# Patient Record
Sex: MALE | Race: BLACK OR AFRICAN AMERICAN | NOT HISPANIC OR LATINO | URBAN - METROPOLITAN AREA
[De-identification: names, ages, dates, MRNs, and addresses within clinical notes are randomized per-mention and may not be internally consistent; named-entity substitution may affect disease eponyms.]

---

## 2018-05-27 ENCOUNTER — EMERGENCY (EMERGENCY)
Facility: HOSPITAL | Age: 23
LOS: 1 days | Discharge: HOME | End: 2018-05-27
Attending: EMERGENCY MEDICINE | Admitting: EMERGENCY MEDICINE
Payer: COMMERCIAL

## 2018-05-27 VITALS
OXYGEN SATURATION: 100 % | DIASTOLIC BLOOD PRESSURE: 68 MMHG | RESPIRATION RATE: 16 BRPM | TEMPERATURE: 98 F | SYSTOLIC BLOOD PRESSURE: 143 MMHG | HEART RATE: 57 BPM

## 2018-05-27 VITALS
HEART RATE: 64 BPM | SYSTOLIC BLOOD PRESSURE: 136 MMHG | TEMPERATURE: 98 F | RESPIRATION RATE: 16 BRPM | OXYGEN SATURATION: 100 % | DIASTOLIC BLOOD PRESSURE: 72 MMHG

## 2018-05-27 DIAGNOSIS — Z98.890 OTHER SPECIFIED POSTPROCEDURAL STATES: Chronic | ICD-10-CM

## 2018-05-27 DIAGNOSIS — Z90.49 ACQUIRED ABSENCE OF OTHER SPECIFIED PARTS OF DIGESTIVE TRACT: Chronic | ICD-10-CM

## 2018-05-27 LAB
ALBUMIN SERPL ELPH-MCNC: 5.3 G/DL — HIGH (ref 3.3–5)
ALP SERPL-CCNC: 82 U/L — SIGNIFICANT CHANGE UP (ref 40–120)
ALT FLD-CCNC: 16 U/L — SIGNIFICANT CHANGE UP (ref 4–41)
APAP SERPL-MCNC: < 15 UG/ML — LOW (ref 15–25)
AST SERPL-CCNC: 26 U/L — SIGNIFICANT CHANGE UP (ref 4–40)
BASOPHILS # BLD AUTO: 0.03 K/UL — SIGNIFICANT CHANGE UP (ref 0–0.2)
BASOPHILS NFR BLD AUTO: 0.2 % — SIGNIFICANT CHANGE UP (ref 0–2)
BASOPHILS NFR SPEC: 0 % — SIGNIFICANT CHANGE UP (ref 0–2)
BILIRUB SERPL-MCNC: < 0.2 MG/DL — LOW (ref 0.2–1.2)
BUN SERPL-MCNC: 11 MG/DL — SIGNIFICANT CHANGE UP (ref 7–23)
CALCIUM SERPL-MCNC: 9.8 MG/DL — SIGNIFICANT CHANGE UP (ref 8.4–10.5)
CHLORIDE SERPL-SCNC: 100 MMOL/L — SIGNIFICANT CHANGE UP (ref 98–107)
CO2 SERPL-SCNC: 25 MMOL/L — SIGNIFICANT CHANGE UP (ref 22–31)
CREAT SERPL-MCNC: 0.97 MG/DL — SIGNIFICANT CHANGE UP (ref 0.5–1.3)
EOSINOPHIL # BLD AUTO: 0.02 K/UL — SIGNIFICANT CHANGE UP (ref 0–0.5)
EOSINOPHIL NFR BLD AUTO: 0.1 % — SIGNIFICANT CHANGE UP (ref 0–6)
EOSINOPHIL NFR FLD: 0 % — SIGNIFICANT CHANGE UP (ref 0–6)
ETHANOL BLD-MCNC: 139 MG/DL — HIGH
GIANT PLATELETS BLD QL SMEAR: PRESENT — SIGNIFICANT CHANGE UP
GLUCOSE SERPL-MCNC: 100 MG/DL — HIGH (ref 70–99)
HCT VFR BLD CALC: 43.7 % — SIGNIFICANT CHANGE UP (ref 39–50)
HGB BLD-MCNC: 14.1 G/DL — SIGNIFICANT CHANGE UP (ref 13–17)
IMM GRANULOCYTES # BLD AUTO: 0.06 # — SIGNIFICANT CHANGE UP
IMM GRANULOCYTES NFR BLD AUTO: 0.4 % — SIGNIFICANT CHANGE UP (ref 0–1.5)
LYMPHOCYTES # BLD AUTO: 1.89 K/UL — SIGNIFICANT CHANGE UP (ref 1–3.3)
LYMPHOCYTES # BLD AUTO: 11.5 % — LOW (ref 13–44)
LYMPHOCYTES NFR SPEC AUTO: 9.5 % — LOW (ref 13–44)
MAGNESIUM SERPL-MCNC: 2 MG/DL — SIGNIFICANT CHANGE UP (ref 1.6–2.6)
MCHC RBC-ENTMCNC: 29.7 PG — SIGNIFICANT CHANGE UP (ref 27–34)
MCHC RBC-ENTMCNC: 32.3 % — SIGNIFICANT CHANGE UP (ref 32–36)
MCV RBC AUTO: 92.2 FL — SIGNIFICANT CHANGE UP (ref 80–100)
MONOCYTES # BLD AUTO: 1.6 K/UL — HIGH (ref 0–0.9)
MONOCYTES NFR BLD AUTO: 9.7 % — SIGNIFICANT CHANGE UP (ref 2–14)
MONOCYTES NFR BLD: 3.4 % — SIGNIFICANT CHANGE UP (ref 2–9)
MORPHOLOGY BLD-IMP: NORMAL — SIGNIFICANT CHANGE UP
NEUTROPHIL AB SER-ACNC: 82.8 % — HIGH (ref 43–77)
NEUTROPHILS # BLD AUTO: 12.88 K/UL — HIGH (ref 1.8–7.4)
NEUTROPHILS NFR BLD AUTO: 78.1 % — HIGH (ref 43–77)
NEUTS BAND # BLD: 1.7 % — SIGNIFICANT CHANGE UP (ref 0–6)
NRBC # FLD: 0 — SIGNIFICANT CHANGE UP
PHOSPHATE SERPL-MCNC: 4.6 MG/DL — HIGH (ref 2.5–4.5)
PLATELET # BLD AUTO: 215 K/UL — SIGNIFICANT CHANGE UP (ref 150–400)
PLATELET COUNT - ESTIMATE: NORMAL — SIGNIFICANT CHANGE UP
PMV BLD: 11.5 FL — SIGNIFICANT CHANGE UP (ref 7–13)
POTASSIUM SERPL-MCNC: 3.8 MMOL/L — SIGNIFICANT CHANGE UP (ref 3.5–5.3)
POTASSIUM SERPL-SCNC: 3.8 MMOL/L — SIGNIFICANT CHANGE UP (ref 3.5–5.3)
PROT SERPL-MCNC: 8.6 G/DL — HIGH (ref 6–8.3)
RBC # BLD: 4.74 M/UL — SIGNIFICANT CHANGE UP (ref 4.2–5.8)
RBC # FLD: 13.3 % — SIGNIFICANT CHANGE UP (ref 10.3–14.5)
SALICYLATES SERPL-MCNC: < 5 MG/DL — LOW (ref 15–30)
SODIUM SERPL-SCNC: 142 MMOL/L — SIGNIFICANT CHANGE UP (ref 135–145)
VARIANT LYMPHS # BLD: 2.6 % — SIGNIFICANT CHANGE UP
WBC # BLD: 16.48 K/UL — HIGH (ref 3.8–10.5)
WBC # FLD AUTO: 16.48 K/UL — HIGH (ref 3.8–10.5)

## 2018-05-27 PROCEDURE — 99053 MED SERV 10PM-8AM 24 HR FAC: CPT

## 2018-05-27 PROCEDURE — 72170 X-RAY EXAM OF PELVIS: CPT | Mod: 26

## 2018-05-27 PROCEDURE — 70486 CT MAXILLOFACIAL W/O DYE: CPT | Mod: 26

## 2018-05-27 PROCEDURE — 70450 CT HEAD/BRAIN W/O DYE: CPT | Mod: 26

## 2018-05-27 PROCEDURE — 99285 EMERGENCY DEPT VISIT HI MDM: CPT | Mod: 25

## 2018-05-27 RX ORDER — KETOROLAC TROMETHAMINE 30 MG/ML
15 SYRINGE (ML) INJECTION ONCE
Qty: 0 | Refills: 0 | Status: DISCONTINUED | OUTPATIENT
Start: 2018-05-27 | End: 2018-05-27

## 2018-05-27 RX ADMIN — Medication 15 MILLIGRAM(S): at 09:50

## 2018-05-27 RX ADMIN — Medication 15 MILLIGRAM(S): at 10:14

## 2018-05-27 NOTE — ED PROVIDER NOTE - EYES, MLM
left eye depressed, pain with extraocular eye movements on left; Clear bilaterally, pupils equal, round and reactive to light.
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
## 2018-05-27 NOTE — ED PROVIDER NOTE - CONSTITUTIONAL, MLM
normal... somnolent but arousable, alert, oriented to person, place, time/situation and in no apparent distress
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
## 2018-05-27 NOTE — ED PROVIDER NOTE - PROGRESS NOTE DETAILS
Anthony (resident):  CT Head/Maxillofacial showing left maxillary sinus fracture.  OMFS consulted. Anthony (resident):  Patient by OMFS and Opthalmology, cleared for discharge by both services.  Will provide discharge instructions as recommended to patient.

## 2018-05-27 NOTE — CONSULT NOTE ADULT - ASSESSMENT
Assessment/Plan: 23yMale with left maxillary sinus fracture.  - No Acute OMFS intervention at this time.   - Augmentin 875 BID 7 days  - Pain medication as per ED  - No pressure to face, ice to face  - Liquid, non-chew diet 4-6 weeks  - Rec Sinus precautions (no nose blowing, no sucking on straws, sneeze with mouth open)  - Follow up in OMFS clinic in 1 week. Call [phone] to schedule an appointment Assessment/Plan: 23yMale with left lateral maxillary sinus wall fracture and possible left TMJ capsulitis.  - No Acute OMFS intervention at this time.  - Augmentin 875 BID 7 days  - Pain medication as per ED  - No pressure to face, ice to face  - Liquid, non-chew diet 4 weeks  - Sinus precautions (no nose blowing, no sucking on straws, sneeze with mouth open)  - Follow up in OMFS clinic in 1 week. Call [phone] to schedule an appointment  Discussed with attending. Assessment/Plan: 23yMale with left lateral maxillary sinus wall fracture and possible left TMJ capsulitis.  - Recommend opthalmology consult  - No Acute OMFS intervention at this time  - Augmentin 875 BID 7 days  - Pain medication as per ED  - No pressure to face, ice to face  - Liquid, non-chew diet 4 weeks  - Sinus precautions (no nose blowing, no sucking on straws, sneeze with mouth open)  - Follow up in OMFS clinic in 1 week. Call [phone] to schedule an appointment  Discussed with attending. Assessment/Plan: 23yMale with left lateral maxillary sinus wall fracture and possible left TMJ capsulitis.  - Recommend opthalmology consult  - No Acute OMFS intervention at this time  - Augmentin 875 BID 7 days  - Pain medication as per ED  - No pressure to face, ice to face  - Avoid wide opening  - Liquid, non-chew diet 4 weeks  - Sinus precautions (no nose blowing, no sucking on straws, sneeze with mouth open)  - Follow up in OMFS clinic in 1 week. Call [phone] to schedule an appointment  Discussed with attending.

## 2018-05-27 NOTE — ED PROVIDER NOTE - ENMT, MLM
4 Airway patent, Nasal mucosa clear. Mouth with normal mucosa. Throat has no vesicles, no oropharyngeal exudates and uvula is midline.  +erythema and swelling under left eye

## 2021-04-03 ENCOUNTER — EMERGENCY (EMERGENCY)
Facility: HOSPITAL | Age: 26
LOS: 1 days | Discharge: HOME | End: 2021-04-03
Admitting: EMERGENCY MEDICINE
Payer: COMMERCIAL

## 2021-04-03 VITALS
RESPIRATION RATE: 18 BRPM | HEART RATE: 55 BPM | SYSTOLIC BLOOD PRESSURE: 121 MMHG | DIASTOLIC BLOOD PRESSURE: 77 MMHG | TEMPERATURE: 98 F | OXYGEN SATURATION: 100 %

## 2021-04-03 VITALS
OXYGEN SATURATION: 100 % | TEMPERATURE: 98 F | DIASTOLIC BLOOD PRESSURE: 75 MMHG | SYSTOLIC BLOOD PRESSURE: 132 MMHG | RESPIRATION RATE: 18 BRPM | HEIGHT: 65 IN | HEART RATE: 67 BPM

## 2021-04-03 DIAGNOSIS — Z98.890 OTHER SPECIFIED POSTPROCEDURAL STATES: Chronic | ICD-10-CM

## 2021-04-03 DIAGNOSIS — Z90.49 ACQUIRED ABSENCE OF OTHER SPECIFIED PARTS OF DIGESTIVE TRACT: Chronic | ICD-10-CM

## 2021-04-03 PROCEDURE — 99284 EMERGENCY DEPT VISIT MOD MDM: CPT

## 2021-04-03 PROCEDURE — 72100 X-RAY EXAM L-S SPINE 2/3 VWS: CPT | Mod: 26

## 2021-04-03 RX ORDER — ONDANSETRON 8 MG/1
1 TABLET, FILM COATED ORAL
Qty: 12 | Refills: 0
Start: 2021-04-03 | End: 2021-04-05

## 2021-04-03 RX ORDER — DICLOFENAC SODIUM 30 MG/G
2 GEL TOPICAL
Qty: 40 | Refills: 0
Start: 2021-04-03 | End: 2021-04-07

## 2021-04-03 RX ORDER — IBUPROFEN 200 MG
600 TABLET ORAL ONCE
Refills: 0 | Status: COMPLETED | OUTPATIENT
Start: 2021-04-03 | End: 2021-04-03

## 2021-04-03 RX ORDER — ACETAMINOPHEN 500 MG
650 TABLET ORAL ONCE
Refills: 0 | Status: COMPLETED | OUTPATIENT
Start: 2021-04-03 | End: 2021-04-03

## 2021-04-03 RX ADMIN — Medication 600 MILLIGRAM(S): at 17:37

## 2021-04-03 RX ADMIN — Medication 650 MILLIGRAM(S): at 17:37

## 2021-04-03 NOTE — ED PROVIDER NOTE - PATIENT PORTAL LINK FT
You can access the FollowMyHealth Patient Portal offered by [facility] by registering at the following website: [email or web address]. By joining [facility]’s FollowMyHealth portal, you will also be able to view your health information using other applications (apps) compatible with our system.
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
## 2021-04-03 NOTE — ED PROVIDER NOTE - PHYSICAL EXAMINATION
Vital signs reviewed.   CONSTITUTIONAL: Well-appearing; well-nourished; in no apparent distress. Non-toxic appearing.   HEAD: Normocephalic, atraumatic.  EYES: PERRL, EOM intact, Normal conjunctiva and no sclera injection noted  ENT: normal nose; no rhinorrhea; No tenderness to nasal bridge. No septal hematoma noted. Normal pharynx with no tonsillar hypertrophy, no erythema, no exudate, dentition intact  NECK: No midline tenderness. FAROM w/ extension/flexion.  CARD: Normal S1, S2  RESP: Normal chest excursion with respiration; breath sounds clear and equal bilaterally  ABD/GI: soft, non-distended; non-tender  EXT/MS: moves all extremities; distal pulses are normal, no pedal edema.  SKIN: Normal for age and race; warm; dry; good turgor; no apparent lesions or exudate noted.  NEURO: Awake, alert, oriented x 3, no gross deficits, CN II-XII grossly intact, no motor or sensory deficit noted. Rapid Alt movements intact. heel to shin intact.  PSYCH: Normal mood; appropriate affect.

## 2021-04-03 NOTE — ED PROVIDER NOTE - NS ED ROS FT
Constitutional: (-) fever   Head: Normal cephalic, Atraumatic  Eyes/ENT: (-) vision changes  Cardiovascular: (-) chest pain, (-) wheezing  Respiratory: (-) cough, (-) shortness of breath  Gastrointestinal: (-) vomiting, (-) diarrhea, (-) abdominal pain  : (-) dysuria   Musculoskeletal: (+) back pain  Integumentary: (-) rash, (-) edema  Neurological: (-)loc  Allergic/Immunologic: (-) pruritus
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
## 2021-04-28 ENCOUNTER — EMERGENCY (EMERGENCY)
Facility: HOSPITAL | Age: 26
LOS: 1 days | Discharge: HOME | End: 2021-04-28
Admitting: EMERGENCY MEDICINE
Payer: COMMERCIAL

## 2021-04-28 VITALS
DIASTOLIC BLOOD PRESSURE: 62 MMHG | HEART RATE: 79 BPM | TEMPERATURE: 98 F | SYSTOLIC BLOOD PRESSURE: 114 MMHG | RESPIRATION RATE: 18 BRPM | OXYGEN SATURATION: 100 %

## 2021-04-28 VITALS
RESPIRATION RATE: 18 BRPM | HEART RATE: 58 BPM | TEMPERATURE: 98 F | HEIGHT: 65 IN | SYSTOLIC BLOOD PRESSURE: 117 MMHG | DIASTOLIC BLOOD PRESSURE: 59 MMHG | OXYGEN SATURATION: 100 %

## 2021-04-28 DIAGNOSIS — Z90.49 ACQUIRED ABSENCE OF OTHER SPECIFIED PARTS OF DIGESTIVE TRACT: Chronic | ICD-10-CM

## 2021-04-28 DIAGNOSIS — Z98.890 OTHER SPECIFIED POSTPROCEDURAL STATES: Chronic | ICD-10-CM

## 2021-04-28 LAB
ALBUMIN SERPL ELPH-MCNC: 4.5 G/DL — SIGNIFICANT CHANGE UP (ref 3.3–5)
ALP SERPL-CCNC: 62 U/L — SIGNIFICANT CHANGE UP (ref 40–120)
ALT FLD-CCNC: 96 U/L — HIGH (ref 4–41)
ANION GAP SERPL CALC-SCNC: 17 MMOL/L — HIGH (ref 7–14)
AST SERPL-CCNC: 259 U/L — HIGH (ref 4–40)
BASOPHILS # BLD AUTO: 0.02 K/UL — SIGNIFICANT CHANGE UP (ref 0–0.2)
BASOPHILS NFR BLD AUTO: 0.3 % — SIGNIFICANT CHANGE UP (ref 0–2)
BILIRUB SERPL-MCNC: 0.5 MG/DL — SIGNIFICANT CHANGE UP (ref 0.2–1.2)
BUN SERPL-MCNC: 10 MG/DL — SIGNIFICANT CHANGE UP (ref 7–23)
CALCIUM SERPL-MCNC: 9.9 MG/DL — SIGNIFICANT CHANGE UP (ref 8.4–10.5)
CHLORIDE SERPL-SCNC: 98 MMOL/L — SIGNIFICANT CHANGE UP (ref 98–107)
CO2 SERPL-SCNC: 22 MMOL/L — SIGNIFICANT CHANGE UP (ref 22–31)
CREAT SERPL-MCNC: 0.93 MG/DL — SIGNIFICANT CHANGE UP (ref 0.5–1.3)
EOSINOPHIL # BLD AUTO: 0.17 K/UL — SIGNIFICANT CHANGE UP (ref 0–0.5)
EOSINOPHIL NFR BLD AUTO: 2.4 % — SIGNIFICANT CHANGE UP (ref 0–6)
GLUCOSE SERPL-MCNC: 61 MG/DL — LOW (ref 70–99)
HCT VFR BLD CALC: 37.8 % — LOW (ref 39–50)
HGB BLD-MCNC: 12.2 G/DL — LOW (ref 13–17)
IANC: 3.71 K/UL — SIGNIFICANT CHANGE UP (ref 1.5–8.5)
IMM GRANULOCYTES NFR BLD AUTO: 0.3 % — SIGNIFICANT CHANGE UP (ref 0–1.5)
LYMPHOCYTES # BLD AUTO: 2.1 K/UL — SIGNIFICANT CHANGE UP (ref 1–3.3)
LYMPHOCYTES # BLD AUTO: 29.9 % — SIGNIFICANT CHANGE UP (ref 13–44)
MCHC RBC-ENTMCNC: 30.1 PG — SIGNIFICANT CHANGE UP (ref 27–34)
MCHC RBC-ENTMCNC: 32.3 GM/DL — SIGNIFICANT CHANGE UP (ref 32–36)
MCV RBC AUTO: 93.3 FL — SIGNIFICANT CHANGE UP (ref 80–100)
MONOCYTES # BLD AUTO: 1 K/UL — HIGH (ref 0–0.9)
MONOCYTES NFR BLD AUTO: 14.2 % — HIGH (ref 2–14)
NEUTROPHILS # BLD AUTO: 3.71 K/UL — SIGNIFICANT CHANGE UP (ref 1.8–7.4)
NEUTROPHILS NFR BLD AUTO: 52.9 % — SIGNIFICANT CHANGE UP (ref 43–77)
NRBC # BLD: 0 /100 WBCS — SIGNIFICANT CHANGE UP
NRBC # FLD: 0 K/UL — SIGNIFICANT CHANGE UP
PLATELET # BLD AUTO: 199 K/UL — SIGNIFICANT CHANGE UP (ref 150–400)
POTASSIUM SERPL-MCNC: 3.9 MMOL/L — SIGNIFICANT CHANGE UP (ref 3.5–5.3)
POTASSIUM SERPL-SCNC: 3.9 MMOL/L — SIGNIFICANT CHANGE UP (ref 3.5–5.3)
PROT SERPL-MCNC: 7.4 G/DL — SIGNIFICANT CHANGE UP (ref 6–8.3)
RBC # BLD: 4.05 M/UL — LOW (ref 4.2–5.8)
RBC # FLD: 13.2 % — SIGNIFICANT CHANGE UP (ref 10.3–14.5)
SODIUM SERPL-SCNC: 137 MMOL/L — SIGNIFICANT CHANGE UP (ref 135–145)
TROPONIN T, HIGH SENSITIVITY RESULT: <6 NG/L — SIGNIFICANT CHANGE UP
WBC # BLD: 7.02 K/UL — SIGNIFICANT CHANGE UP (ref 3.8–10.5)
WBC # FLD AUTO: 7.02 K/UL — SIGNIFICANT CHANGE UP (ref 3.8–10.5)

## 2021-04-28 PROCEDURE — 71046 X-RAY EXAM CHEST 2 VIEWS: CPT | Mod: 26

## 2021-04-28 PROCEDURE — 93010 ELECTROCARDIOGRAM REPORT: CPT

## 2021-04-28 PROCEDURE — 99285 EMERGENCY DEPT VISIT HI MDM: CPT | Mod: 25

## 2021-04-28 PROCEDURE — 76705 ECHO EXAM OF ABDOMEN: CPT | Mod: 26

## 2021-04-28 RX ORDER — ACETAMINOPHEN 500 MG
975 TABLET ORAL ONCE
Refills: 0 | Status: COMPLETED | OUTPATIENT
Start: 2021-04-28 | End: 2021-04-28

## 2021-04-28 RX ORDER — FAMOTIDINE 10 MG/ML
1 INJECTION INTRAVENOUS
Qty: 60 | Refills: 0
Start: 2021-04-28 | End: 2021-05-27

## 2021-04-28 RX ADMIN — Medication 975 MILLIGRAM(S): at 14:56

## 2021-04-28 NOTE — ED PROVIDER NOTE - NSFOLLOWUPINSTRUCTIONS_ED_ALL_ED_FT
Rest, drink plenty of fluids.  Take Pepcid two times per day. Reduce your alcohol intake, follow up with a Cardiologist and Gastroenterologist. The ER will schedule you for an appointment, if they do not call  to schedule an appointment. Advance activity as tolerated.  Continue all previously prescribed medications as directed.  Follow up with your primary care physician in 48-72 hours- bring copies of your results.  Return to the ER for worsening or persistent symptoms, and/or ANY NEW OR CONCERNING SYMPTOMS. If you have issues obtaining follow up, please call: [phone] to obtain a doctor or specialist who takes your insurance in your area.  You may call [phone] to make an appointment with the internal medicine clinic.

## 2021-04-28 NOTE — ED PROVIDER NOTE - CLINICAL SUMMARY MEDICAL DECISION MAKING FREE TEXT BOX
24 Y/O M denies PMH PSH Appendectomy, R meniscus repair C/O CP which is sub-sternal worse with exertion. Pt denies FH of heart issues or sudden death. Pt states that he has not taken anything for pain, states the pain is not worse with position. Plan is CXR to eval for consolidation, labs to eval for anemia or electrolyte disturbance and trop to eval for ACS. If neg will D/C with outpatient Cardiology F/U.

## 2021-04-28 NOTE — ED PROVIDER NOTE - PROGRESS NOTE DETAILS
HOPE Irving: Results back, US is normal, AST>ALT, likely ETOH use which pt endorses socially, not vomiting, will D/C with outpatient GI and Cardiology F/U.

## 2021-04-28 NOTE — ED PROVIDER NOTE - OBJECTIVE STATEMENT
26 Y/O M denies PMH PSH Appendectomy, R meniscus repair C/O CP which is sub-sternal worse with exertion. Pt denies FH of heart issues or sudden death. Pt states that he has not taken anything for pain, states the pain is not worse with position. States the pain is currently 4/10. Pt states he has not taken anything for the pain. Pt denies any other sx or acute complaints.

## 2021-04-28 NOTE — ED PROVIDER NOTE - PATIENT PORTAL LINK FT
You can access the FollowMyHealth Patient Portal offered by [facility] by registering at the following website: [email or web address]. By joining [employer]’s FollowMyHealth portal, you will also be able to view your health information using other applications (apps) compatible with our system.

## 2021-05-05 ENCOUNTER — APPOINTMENT (OUTPATIENT)
Dept: GASTROENTEROLOGY | Facility: CLINIC | Age: 26
End: 2021-05-05
Payer: COMMERCIAL

## 2021-05-05 ENCOUNTER — NON-APPOINTMENT (OUTPATIENT)
Age: 26
End: 2021-05-05

## 2021-05-05 VITALS
TEMPERATURE: 97.7 F | WEIGHT: 206 LBS | DIASTOLIC BLOOD PRESSURE: 76 MMHG | SYSTOLIC BLOOD PRESSURE: 143 MMHG | OXYGEN SATURATION: 97 % | HEIGHT: 65 IN | RESPIRATION RATE: 17 BRPM | HEART RATE: 84 BPM | BODY MASS INDEX: 34.32 KG/M2

## 2021-05-05 DIAGNOSIS — K21.9 GASTRO-ESOPHAGEAL REFLUX DISEASE W/OUT ESOPHAGITIS: ICD-10-CM

## 2021-05-05 DIAGNOSIS — R74.8 ABNORMAL LEVELS OF OTHER SERUM ENZYMES: ICD-10-CM

## 2021-05-05 PROCEDURE — 99072 ADDL SUPL MATRL&STAF TM PHE: CPT

## 2021-05-05 PROCEDURE — 99205 OFFICE O/P NEW HI 60 MIN: CPT

## 2021-05-05 RX ORDER — OMEPRAZOLE 40 MG/1
40 CAPSULE, DELAYED RELEASE ORAL
Qty: 30 | Refills: 5 | Status: ACTIVE | COMMUNITY
Start: 2021-05-05

## 2021-05-05 NOTE — ASSESSMENT
[FreeTextEntry1] : Sono - Neg \par \par The numerous causes of increased liver tests- including some of the below,  were discussed at length and all questions were answered. \par \par Your doctor determines the specific cause of your elevated liver enzymes by reviewing your medications, your signs and symptoms and, in some cases, other tests and procedures.\par More common causes of elevated liver enzymes include:\par Fatty Liver \par Over-the-counter pain medications, particularly acetaminophen (Tylenol, others)\par Certain prescription medications, including statin drugs used to control cholesterol\par Drinking alcohol\par Heart failure \par Nonalcoholic fatty liver disease Obesity </diseases-conditions/obesity/symptoms-causes/[facility]-[id number]> \par Other causes of elevated liver enzymes may include:\par Celiac disease\par Hemochromatosis \par Liver cancer \par Mononucleosis\par Polymyositis \par Thyroid disorders\par \par \par \par A low acid / reflux diet was discussed in great detail including  not smoking, not drinking alcohol, and not consuming foods that irritate the esophagus. It is helpful to eat small meals throughout the day instead of large meals. You should avoid eating before bedtime or lying down after you eat. It can be helpful to raise the head of your bed six inches. Additionally, you should maintain a healthy weight and good posture.. The patient was given written material to take home and review.\par

## 2021-05-06 DIAGNOSIS — R07.89 OTHER CHEST PAIN: ICD-10-CM

## 2021-05-06 DIAGNOSIS — R74.01 ELEVATION OF LEVELS OF LIVER TRANSAMINASE LEVELS: ICD-10-CM

## 2021-05-19 ENCOUNTER — APPOINTMENT (OUTPATIENT)
Dept: CARDIOLOGY | Facility: CLINIC | Age: 26
End: 2021-05-19

## 2021-06-24 ENCOUNTER — APPOINTMENT (OUTPATIENT)
Dept: GASTROENTEROLOGY | Facility: CLINIC | Age: 26
End: 2021-06-24

## 2021-06-29 LAB — SARS-COV-2 N GENE NPH QL NAA+PROBE: NOT DETECTED

## 2021-08-06 ENCOUNTER — APPOINTMENT (OUTPATIENT)
Dept: GASTROENTEROLOGY | Facility: HOSPITAL | Age: 26
End: 2021-08-06

## 2022-11-03 ENCOUNTER — INPATIENT (INPATIENT)
Facility: HOSPITAL | Age: 27
LOS: 4 days | Discharge: HOME | End: 2022-11-08
Attending: INTERNAL MEDICINE | Admitting: INTERNAL MEDICINE

## 2022-11-03 VITALS
OXYGEN SATURATION: 100 % | SYSTOLIC BLOOD PRESSURE: 165 MMHG | HEART RATE: 105 BPM | DIASTOLIC BLOOD PRESSURE: 111 MMHG | RESPIRATION RATE: 18 BRPM | TEMPERATURE: 99 F

## 2022-11-03 DIAGNOSIS — Z98.890 OTHER SPECIFIED POSTPROCEDURAL STATES: Chronic | ICD-10-CM

## 2022-11-03 DIAGNOSIS — Z90.49 ACQUIRED ABSENCE OF OTHER SPECIFIED PARTS OF DIGESTIVE TRACT: Chronic | ICD-10-CM

## 2022-11-03 LAB
ALBUMIN SERPL ELPH-MCNC: 5.3 G/DL — HIGH (ref 3.3–5)
ALP SERPL-CCNC: 102 U/L — SIGNIFICANT CHANGE UP (ref 40–120)
ALT FLD-CCNC: 66 U/L — HIGH (ref 4–41)
ANION GAP SERPL CALC-SCNC: 22 MMOL/L — HIGH (ref 7–14)
APTT BLD: 31.5 SEC — SIGNIFICANT CHANGE UP (ref 27–36.3)
AST SERPL-CCNC: 52 U/L — HIGH (ref 4–40)
BASOPHILS # BLD AUTO: 0.05 K/UL — SIGNIFICANT CHANGE UP (ref 0–0.2)
BASOPHILS NFR BLD AUTO: 0.7 % — SIGNIFICANT CHANGE UP (ref 0–2)
BILIRUB SERPL-MCNC: 0.5 MG/DL — SIGNIFICANT CHANGE UP (ref 0.2–1.2)
BUN SERPL-MCNC: 13 MG/DL — SIGNIFICANT CHANGE UP (ref 7–23)
CALCIUM SERPL-MCNC: 10.1 MG/DL — SIGNIFICANT CHANGE UP (ref 8.4–10.5)
CHLORIDE SERPL-SCNC: 95 MMOL/L — LOW (ref 98–107)
CO2 SERPL-SCNC: 19 MMOL/L — LOW (ref 22–31)
CREAT SERPL-MCNC: 0.71 MG/DL — SIGNIFICANT CHANGE UP (ref 0.5–1.3)
EGFR: 129 ML/MIN/1.73M2 — SIGNIFICANT CHANGE UP
EOSINOPHIL # BLD AUTO: 0.1 K/UL — SIGNIFICANT CHANGE UP (ref 0–0.5)
EOSINOPHIL NFR BLD AUTO: 1.4 % — SIGNIFICANT CHANGE UP (ref 0–6)
ETHANOL SERPL-MCNC: <10 MG/DL — SIGNIFICANT CHANGE UP
GLUCOSE SERPL-MCNC: 67 MG/DL — LOW (ref 70–99)
HCT VFR BLD CALC: 40.5 % — SIGNIFICANT CHANGE UP (ref 39–50)
HGB BLD-MCNC: 13.6 G/DL — SIGNIFICANT CHANGE UP (ref 13–17)
IANC: 3.48 K/UL — SIGNIFICANT CHANGE UP (ref 1.8–7.4)
IMM GRANULOCYTES NFR BLD AUTO: 0.3 % — SIGNIFICANT CHANGE UP (ref 0–0.9)
INR BLD: 1.03 RATIO — SIGNIFICANT CHANGE UP (ref 0.88–1.16)
LYMPHOCYTES # BLD AUTO: 2.56 K/UL — SIGNIFICANT CHANGE UP (ref 1–3.3)
LYMPHOCYTES # BLD AUTO: 35.4 % — SIGNIFICANT CHANGE UP (ref 13–44)
MAGNESIUM SERPL-MCNC: 1.9 MG/DL — SIGNIFICANT CHANGE UP (ref 1.6–2.6)
MCHC RBC-ENTMCNC: 30.4 PG — SIGNIFICANT CHANGE UP (ref 27–34)
MCHC RBC-ENTMCNC: 33.6 GM/DL — SIGNIFICANT CHANGE UP (ref 32–36)
MCV RBC AUTO: 90.6 FL — SIGNIFICANT CHANGE UP (ref 80–100)
MONOCYTES # BLD AUTO: 1.03 K/UL — HIGH (ref 0–0.9)
MONOCYTES NFR BLD AUTO: 14.2 % — HIGH (ref 2–14)
NEUTROPHILS # BLD AUTO: 3.48 K/UL — SIGNIFICANT CHANGE UP (ref 1.8–7.4)
NEUTROPHILS NFR BLD AUTO: 48 % — SIGNIFICANT CHANGE UP (ref 43–77)
NRBC # BLD: 0 /100 WBCS — SIGNIFICANT CHANGE UP (ref 0–0)
NRBC # FLD: 0 K/UL — SIGNIFICANT CHANGE UP (ref 0–0)
PHOSPHATE SERPL-MCNC: 3.2 MG/DL — SIGNIFICANT CHANGE UP (ref 2.5–4.5)
PLATELET # BLD AUTO: 253 K/UL — SIGNIFICANT CHANGE UP (ref 150–400)
POTASSIUM SERPL-MCNC: 5 MMOL/L — SIGNIFICANT CHANGE UP (ref 3.5–5.3)
POTASSIUM SERPL-SCNC: 5 MMOL/L — SIGNIFICANT CHANGE UP (ref 3.5–5.3)
PROT SERPL-MCNC: 9 G/DL — HIGH (ref 6–8.3)
PROTHROM AB SERPL-ACNC: 11.9 SEC — SIGNIFICANT CHANGE UP (ref 10.5–13.4)
RBC # BLD: 4.47 M/UL — SIGNIFICANT CHANGE UP (ref 4.2–5.8)
RBC # FLD: 13.3 % — SIGNIFICANT CHANGE UP (ref 10.3–14.5)
SODIUM SERPL-SCNC: 136 MMOL/L — SIGNIFICANT CHANGE UP (ref 135–145)
WBC # BLD: 7.24 K/UL — SIGNIFICANT CHANGE UP (ref 3.8–10.5)
WBC # FLD AUTO: 7.24 K/UL — SIGNIFICANT CHANGE UP (ref 3.8–10.5)

## 2022-11-03 PROCEDURE — 99285 EMERGENCY DEPT VISIT HI MDM: CPT

## 2022-11-03 RX ADMIN — Medication 50 MILLIGRAM(S): at 23:51

## 2022-11-03 RX ADMIN — Medication 50 MILLIGRAM(S): at 22:39

## 2022-11-03 NOTE — ED PROVIDER NOTE - PHYSICAL EXAMINATION
Attending/Gail: Well-appearing, NAD; PERRL/EOMI, non-icterus, supple, no JONATHAN, no JVD, RRR, CTAB; Abd-soft, NT/ND, no HSM; no LE edema, A&Ox3, nonfocal; +shaking, Skin-warm/dry

## 2022-11-03 NOTE — ED PROVIDER NOTE - CLINICAL SUMMARY MEDICAL DECISION MAKING FREE TEXT BOX
27M with heavy alcohol use p/w likely withdrawal. Will treat with librium and reassess. Likely will require an admission. Low suspicion for DT or chance to develop seizures.

## 2022-11-03 NOTE — ED ADULT NURSE NOTE - OBJECTIVE STATEMENT
Pt A&Ox4 ambulatory at baseline, PMH ETOH abuse, presenting to the ED (RM 10) c/o alcohol withdrawal. Pt states drinks 1.75L of Laverne daily since June. Pt started to drink due to stress. Pt states has never withdrawn from alcohol. Pt endorses shakiness, diaphoresis, "crawling" sensation to body. Pt denies any other symptoms, Denies cp, sob, palpitations, dizziness, lightheadedness, n/v/d, fever, chills, cough, HA, blurry vision. Denies SI/HI, Respirations are even and unlabored, pt placed on CM= NSR, 20g IV left hand, labs sent, medicated as per orders. Safety precautions implemented as per protocol, awaiting further MD orders, will continue to monitor. Pt A&Ox4 ambulatory at baseline, PMH ETOH abuse, presenting to the ED (RM 10) c/o alcohol withdrawal. Pt states drinks 1.75L of Laverne daily since June. Pt started to drink due to stress. Pt states has never withdrawn from alcohol. Pt states last drink was today 12PM. Pt endorses shakiness, diaphoresis, "crawling" sensation to body. Pt denies any other symptoms, Denies cp, sob, palpitations, dizziness, lightheadedness, n/v/d, fever, chills, cough, HA, blurry vision. Denies SI/HI, Respirations are even and unlabored, pt placed on CM= NSR, 20g IV left hand, labs sent, medicated as per orders. Safety precautions implemented as per protocol, awaiting further MD orders, will continue to monitor.

## 2022-11-03 NOTE — ED PROVIDER NOTE - OBJECTIVE STATEMENT
28yo M with no PMHx p/w shakiness. Pt endorses that he drinks a bottle of hennesy a day and started to feel "shaky" and tremulous today. Last drink was at noon. Denies ever having withdrawals before. Denies any recent illnesses, fevers, chest pain, abdominal pain, diarrhea. Endorses feeling flushed and anxious. 26yo M with no PMHx p/w shakiness. Pt endorses that he drinks a bottle of hennesy a day and started to feel "shaky" and tremulous today. Last drink was at noon. Denies ever having withdrawals before. Denies any recent illnesses, fevers, chest pain, abdominal pain, diarrhea. Endorses feeling flushed and anxious.    Attending/Gail: 26 yo M as described above. Reports drinking heavily over the past year, no h/o prior withdrawal symptoms. He stated the drinking was due to depression though denies suicidal/homicidal ideation. Pt expressed the desire to stop drinking.

## 2022-11-03 NOTE — ED ADULT TRIAGE NOTE - CHIEF COMPLAINT QUOTE
c/o withdrawal like symptoms. pt states he drinks a bottle of Laverne everyday, last drink was 12pm today. endorses felling tremulous, "skin crawling feeling," numbness and tingling to extremities. Noted to be anxious in triage. no PMH

## 2022-11-04 DIAGNOSIS — K92.1 MELENA: ICD-10-CM

## 2022-11-04 DIAGNOSIS — F10.239 ALCOHOL DEPENDENCE WITH WITHDRAWAL, UNSPECIFIED: ICD-10-CM

## 2022-11-04 DIAGNOSIS — Z29.9 ENCOUNTER FOR PROPHYLACTIC MEASURES, UNSPECIFIED: ICD-10-CM

## 2022-11-04 DIAGNOSIS — F32.89 OTHER SPECIFIED DEPRESSIVE EPISODES: ICD-10-CM

## 2022-11-04 DIAGNOSIS — R74.01 ELEVATION OF LEVELS OF LIVER TRANSAMINASE LEVELS: ICD-10-CM

## 2022-11-04 LAB
A1C WITH ESTIMATED AVERAGE GLUCOSE RESULT: 5 % — SIGNIFICANT CHANGE UP (ref 4–5.6)
ALBUMIN SERPL ELPH-MCNC: 4.5 G/DL — SIGNIFICANT CHANGE UP (ref 3.3–5)
ALBUMIN SERPL ELPH-MCNC: 4.6 G/DL — SIGNIFICANT CHANGE UP (ref 3.3–5)
ALP SERPL-CCNC: 102 U/L — SIGNIFICANT CHANGE UP (ref 40–120)
ALP SERPL-CCNC: 102 U/L — SIGNIFICANT CHANGE UP (ref 40–120)
ALT FLD-CCNC: 52 U/L — HIGH (ref 4–41)
ALT FLD-CCNC: 52 U/L — HIGH (ref 4–41)
ANION GAP SERPL CALC-SCNC: 14 MMOL/L — SIGNIFICANT CHANGE UP (ref 7–14)
ANION GAP SERPL CALC-SCNC: 15 MMOL/L — HIGH (ref 7–14)
AST SERPL-CCNC: 40 U/L — SIGNIFICANT CHANGE UP (ref 4–40)
AST SERPL-CCNC: 41 U/L — HIGH (ref 4–40)
BASE EXCESS BLDV CALC-SCNC: 1.5 MMOL/L — SIGNIFICANT CHANGE UP (ref -2–3)
BILIRUB DIRECT SERPL-MCNC: <0.2 MG/DL — SIGNIFICANT CHANGE UP (ref 0–0.3)
BILIRUB INDIRECT FLD-MCNC: >0.2 MG/DL — SIGNIFICANT CHANGE UP (ref 0–1)
BILIRUB SERPL-MCNC: 0.4 MG/DL — SIGNIFICANT CHANGE UP (ref 0.2–1.2)
BILIRUB SERPL-MCNC: 0.4 MG/DL — SIGNIFICANT CHANGE UP (ref 0.2–1.2)
BLOOD GAS VENOUS COMPREHENSIVE RESULT: SIGNIFICANT CHANGE UP
BUN SERPL-MCNC: 14 MG/DL — SIGNIFICANT CHANGE UP (ref 7–23)
BUN SERPL-MCNC: 14 MG/DL — SIGNIFICANT CHANGE UP (ref 7–23)
CALCIUM SERPL-MCNC: 9.2 MG/DL — SIGNIFICANT CHANGE UP (ref 8.4–10.5)
CALCIUM SERPL-MCNC: 9.3 MG/DL — SIGNIFICANT CHANGE UP (ref 8.4–10.5)
CHLORIDE BLDV-SCNC: 102 MMOL/L — SIGNIFICANT CHANGE UP (ref 96–108)
CHLORIDE SERPL-SCNC: 100 MMOL/L — SIGNIFICANT CHANGE UP (ref 98–107)
CHLORIDE SERPL-SCNC: 100 MMOL/L — SIGNIFICANT CHANGE UP (ref 98–107)
CHOLEST SERPL-MCNC: 319 MG/DL — HIGH
CO2 BLDV-SCNC: 27.9 MMOL/L — HIGH (ref 22–26)
CO2 SERPL-SCNC: 23 MMOL/L — SIGNIFICANT CHANGE UP (ref 22–31)
CO2 SERPL-SCNC: 24 MMOL/L — SIGNIFICANT CHANGE UP (ref 22–31)
CREAT SERPL-MCNC: 0.74 MG/DL — SIGNIFICANT CHANGE UP (ref 0.5–1.3)
CREAT SERPL-MCNC: 0.75 MG/DL — SIGNIFICANT CHANGE UP (ref 0.5–1.3)
EGFR: 127 ML/MIN/1.73M2 — SIGNIFICANT CHANGE UP
EGFR: 127 ML/MIN/1.73M2 — SIGNIFICANT CHANGE UP
ESTIMATED AVERAGE GLUCOSE: 97 — SIGNIFICANT CHANGE UP
GAS PNL BLDV: 137 MMOL/L — SIGNIFICANT CHANGE UP (ref 136–145)
GLUCOSE BLDC GLUCOMTR-MCNC: 124 MG/DL — HIGH (ref 70–99)
GLUCOSE BLDV-MCNC: 89 MG/DL — SIGNIFICANT CHANGE UP (ref 70–99)
GLUCOSE SERPL-MCNC: 87 MG/DL — SIGNIFICANT CHANGE UP (ref 70–99)
GLUCOSE SERPL-MCNC: 89 MG/DL — SIGNIFICANT CHANGE UP (ref 70–99)
HCO3 BLDV-SCNC: 27 MMOL/L — SIGNIFICANT CHANGE UP (ref 22–29)
HCT VFR BLD CALC: 38.5 % — LOW (ref 39–50)
HCT VFR BLDA CALC: 38 % — LOW (ref 39–51)
HDLC SERPL-MCNC: 71 MG/DL — SIGNIFICANT CHANGE UP
HGB BLD CALC-MCNC: 12.8 G/DL — LOW (ref 13–17)
HGB BLD-MCNC: 12.5 G/DL — LOW (ref 13–17)
LACTATE BLDV-MCNC: 0.8 MMOL/L — SIGNIFICANT CHANGE UP (ref 0.5–2)
LIPID PNL WITH DIRECT LDL SERPL: 222 MG/DL — HIGH
MAGNESIUM SERPL-MCNC: 2 MG/DL — SIGNIFICANT CHANGE UP (ref 1.6–2.6)
MCHC RBC-ENTMCNC: 30.6 PG — SIGNIFICANT CHANGE UP (ref 27–34)
MCHC RBC-ENTMCNC: 32.5 GM/DL — SIGNIFICANT CHANGE UP (ref 32–36)
MCV RBC AUTO: 94.4 FL — SIGNIFICANT CHANGE UP (ref 80–100)
NON HDL CHOLESTEROL: 248 MG/DL — HIGH
NRBC # BLD: 0 /100 WBCS — SIGNIFICANT CHANGE UP (ref 0–0)
NRBC # FLD: 0 K/UL — SIGNIFICANT CHANGE UP (ref 0–0)
PCO2 BLDV: 43 MMHG — SIGNIFICANT CHANGE UP (ref 42–55)
PH BLDV: 7.4 — SIGNIFICANT CHANGE UP (ref 7.32–7.43)
PHOSPHATE SERPL-MCNC: 3.7 MG/DL — SIGNIFICANT CHANGE UP (ref 2.5–4.5)
PLATELET # BLD AUTO: 229 K/UL — SIGNIFICANT CHANGE UP (ref 150–400)
PO2 BLDV: 94 MMHG — SIGNIFICANT CHANGE UP
POTASSIUM BLDV-SCNC: 3.5 MMOL/L — SIGNIFICANT CHANGE UP (ref 3.5–5.1)
POTASSIUM SERPL-MCNC: 3.7 MMOL/L — SIGNIFICANT CHANGE UP (ref 3.5–5.3)
POTASSIUM SERPL-MCNC: 3.7 MMOL/L — SIGNIFICANT CHANGE UP (ref 3.5–5.3)
POTASSIUM SERPL-SCNC: 3.7 MMOL/L — SIGNIFICANT CHANGE UP (ref 3.5–5.3)
POTASSIUM SERPL-SCNC: 3.7 MMOL/L — SIGNIFICANT CHANGE UP (ref 3.5–5.3)
PROT SERPL-MCNC: 7.9 G/DL — SIGNIFICANT CHANGE UP (ref 6–8.3)
PROT SERPL-MCNC: 7.9 G/DL — SIGNIFICANT CHANGE UP (ref 6–8.3)
RBC # BLD: 4.08 M/UL — LOW (ref 4.2–5.8)
RBC # FLD: 13.6 % — SIGNIFICANT CHANGE UP (ref 10.3–14.5)
SAO2 % BLDV: 97.8 % — SIGNIFICANT CHANGE UP
SARS-COV-2 RNA SPEC QL NAA+PROBE: SIGNIFICANT CHANGE UP
SODIUM SERPL-SCNC: 138 MMOL/L — SIGNIFICANT CHANGE UP (ref 135–145)
SODIUM SERPL-SCNC: 138 MMOL/L — SIGNIFICANT CHANGE UP (ref 135–145)
TRIGL SERPL-MCNC: 132 MG/DL — SIGNIFICANT CHANGE UP
TSH SERPL-MCNC: 3.5 UIU/ML — SIGNIFICANT CHANGE UP (ref 0.27–4.2)
WBC # BLD: 6.97 K/UL — SIGNIFICANT CHANGE UP (ref 3.8–10.5)
WBC # FLD AUTO: 6.97 K/UL — SIGNIFICANT CHANGE UP (ref 3.8–10.5)

## 2022-11-04 PROCEDURE — 99223 1ST HOSP IP/OBS HIGH 75: CPT

## 2022-11-04 RX ORDER — THIAMINE MONONITRATE (VIT B1) 100 MG
100 TABLET ORAL DAILY
Refills: 0 | Status: DISCONTINUED | OUTPATIENT
Start: 2022-11-04 | End: 2022-11-04

## 2022-11-04 RX ORDER — INFLUENZA VIRUS VACCINE 15; 15; 15; 15 UG/.5ML; UG/.5ML; UG/.5ML; UG/.5ML
0.5 SUSPENSION INTRAMUSCULAR ONCE
Refills: 0 | Status: DISCONTINUED | OUTPATIENT
Start: 2022-11-04 | End: 2022-11-08

## 2022-11-04 RX ORDER — SODIUM CHLORIDE 9 MG/ML
1000 INJECTION, SOLUTION INTRAVENOUS
Refills: 0 | Status: DISCONTINUED | OUTPATIENT
Start: 2022-11-04 | End: 2022-11-04

## 2022-11-04 RX ORDER — THIAMINE MONONITRATE (VIT B1) 100 MG
100 TABLET ORAL DAILY
Refills: 0 | Status: COMPLETED | OUTPATIENT
Start: 2022-11-04 | End: 2022-11-06

## 2022-11-04 RX ORDER — FOLIC ACID 0.8 MG
1 TABLET ORAL DAILY
Refills: 0 | Status: DISCONTINUED | OUTPATIENT
Start: 2022-11-04 | End: 2022-11-08

## 2022-11-04 RX ORDER — PANTOPRAZOLE SODIUM 20 MG/1
40 TABLET, DELAYED RELEASE ORAL
Refills: 0 | Status: DISCONTINUED | OUTPATIENT
Start: 2022-11-04 | End: 2022-11-08

## 2022-11-04 RX ADMIN — Medication 100 MILLIGRAM(S): at 13:05

## 2022-11-04 RX ADMIN — Medication 2 MILLIGRAM(S): at 04:05

## 2022-11-04 RX ADMIN — Medication 50 MILLIGRAM(S): at 13:05

## 2022-11-04 RX ADMIN — Medication 1 MILLIGRAM(S): at 13:05

## 2022-11-04 RX ADMIN — SODIUM CHLORIDE 100 MILLILITER(S): 9 INJECTION, SOLUTION INTRAVENOUS at 04:16

## 2022-11-04 RX ADMIN — Medication 50 MILLIGRAM(S): at 02:22

## 2022-11-04 RX ADMIN — Medication 50 MILLIGRAM(S): at 08:23

## 2022-11-04 RX ADMIN — SODIUM CHLORIDE 100 MILLILITER(S): 9 INJECTION, SOLUTION INTRAVENOUS at 08:25

## 2022-11-04 RX ADMIN — PANTOPRAZOLE SODIUM 40 MILLIGRAM(S): 20 TABLET, DELAYED RELEASE ORAL at 06:00

## 2022-11-04 RX ADMIN — Medication 1 TABLET(S): at 13:05

## 2022-11-04 NOTE — H&P ADULT - PROBLEM SELECTOR PLAN 3
IMPROVE SCORE 0, ambulate PRN for DVT ppx likely secondary to heavy alcohol use  abstain from alcohol  trend   check lipid panel

## 2022-11-04 NOTE — PATIENT PROFILE ADULT - FALL HARM RISK - PATIENT NEEDS ASSISTANCE
Pallavi Marin is here for a checkup. She has no gynecologic complaints. Her mammogram May of last year was normal and she is scheduled to have colonoscopy October of this year.     On examination:  [facility]  Obstetrics and Gynecology  Cori by nebulization every 6 (six) hours as needed for Wheezing. • escitalopram 10 MG Oral Tab Take 10 mg by mouth daily. • cetirizine 10 MG Oral Tab Take 10 mg by mouth daily. • Rosuvastatin Calcium 10 MG Oral Tab Take 10 mg by mouth nightly. suspicious lesions  HEENT: normal  NECK: supple; no thyroidmegaly, no adenopathy  LUNGS: clear to auscultation  CARDIOVASCULAR: normal S1, S2, RRR  BREASTS: Bilaterally normal firm, nontendder, no palpable masses or nodes, no nipple discharge, no skin gudino Standing/Walking

## 2022-11-04 NOTE — H&P ADULT - PROBLEM SELECTOR PLAN 4
IMPROVE SCORE 0, ambulate PRN for DVT ppx    Needs PMD referral on discharge    #low bicarb, elevated AG  possibly due to alcohol use however alcohol level low  repeat BMP in 4 hours  check VBG IMPROVE SCORE 0, ambulate PRN for DVT ppx    Needs PMD referral on discharge    #low bicarb, elevated AG  possibly due to alcohol use however serum alcohol negative   repeat BMP in 4 hours  check VBG

## 2022-11-04 NOTE — H&P ADULT - HISTORY OF PRESENT ILLNESS
27-year-old male with no past medical history, presenting from home with alcohol withdrawal. He reports feeling tremulous, with generalized pruritis and (+)formication since 1700 last night. He drinks 1.75L of Kelly daily, last drink was Thursday 11/02 at 12PM. He had one episode of N/V yesterday. He denies any prior history of withdrawal.    In the ED VS:     27-year-old male with prior medical history of anemia, presenting from home with alcohol withdrawal. He reports feeling tremulous, with generalized pruritis and (+)formication since 1700 last night. He drinks 1.75L of Kelly daily, last drink was Thursday 11/02 at 12PM. He had one episode of N/V yesterday, reports black stool and diarrhea for the past 2 weeks. He denies any prior history of withdrawal.    In the ED VS:  98.6    159-167/  16-19  100%RA, CIWA 6, received chlordiazepoxide 50mg PO x2

## 2022-11-04 NOTE — PROGRESS NOTE ADULT - PROBLEM SELECTOR PLAN 1
denies prior history of withdrawal or prior hospitalizations  last drink 11/02 12PM  c/w chlordiazepoxide taper  high risk CIWA monitoring w/PRN benzodiazepines available as well  SW consult  thiamine, MV, folic acid supplementation    #diarrhea  suspect secondary to alcohol withdrawal  stool counts   given report of melena check routine FOBT, started on PPI for now  if persistent, can check stool studies, no leukocytosis, afebrile States that he had been consuming 1 bottle of liquor daily for roughly 1 year. No hx of withdrawal or prior hospitalizations for withdrawal. Reporting desire to quit consuming alcohol.   - last drink 11/02 12PM  > d/c chlordiazepoxide taper as pt noted to be lethargic   > start on symptom triggered ativan taper   > c/w thiamine 100mg q3 days   > c/w folic acid supplementation

## 2022-11-04 NOTE — H&P ADULT - NSHPREVIEWOFSYSTEMS_GEN_ALL_CORE
REVIEW OF SYSTEMS:    CONSTITUTIONAL: No weakness, fevers or chills  EYES/ENT: No visual changes; No dysphagia; No sore throat; No rhinorrhea; No sinus pain/pressure  NECK: No pain or stiffness  RESPIRATORY: No cough, wheezing, hemoptysis; No shortness of breath  CARDIOVASCULAR: No chest pain or palpitations; No lower extremity edema  GASTROINTESTINAL: No abdominal or epigastric pain. (+) nausea, vomiting; No hematemesis; (+) diarrhea 5x/day x2week; No constipation. (+) melena; No hematochezia.  GENITOURINARY: No dysuria, frequency or hematuria  NEUROLOGICAL: No numbness, paresthesias, or weakness; No HA; No LH/dizziness  PSYCH: (+)panic attacks, last episode today; denies anxiety; (+)depression; No SI/HI  MSK: ambulates without aid; No falls  SKIN: (+) diffuse itching; No burning, rashes, or lesions   All other review of systems is negative unless indicated above.

## 2022-11-04 NOTE — H&P ADULT - NSHPPHYSICALEXAM_GEN_ALL_CORE
Vital Signs Last 24 Hrs  T(C): 36.7 (03 Nov 2022 23:11), Max: 37 (03 Nov 2022 20:34)  T(F): 98.1 (03 Nov 2022 23:11), Max: 98.6 (03 Nov 2022 20:34)  HR: 117 (03 Nov 2022 23:53) (93 - 117)  BP: 159/92 (03 Nov 2022 23:53) (159/92 - 167/92)  RR: 16 (03 Nov 2022 23:53) (16 - 19)  SpO2: 100% (03 Nov 2022 23:53) (100% - 100%)    Parameters below as of 03 Nov 2022 23:53  Patient On (Oxygen Delivery Method): room air    PHYSICAL EXAM:  GENERAL: NAD, well-developed, well-nourished  HEAD:  Atraumatic, Normocephalic  EYES: EOMI, PERRL, conjunctiva and sclera clear  NECK: Supple, No JVD  CHEST/LUNG: Clear to auscultation bilaterally; No wheezes, rales or rhonchi; normal work of breathing, speaking in full sentences  HEART: Regular rate and rhythm; No murmurs, rubs, or gallops, (+)S1, S2  ABDOMEN: Soft, Nontender, Nondistended; Normal Bowel sounds   EXTREMITIES:  2+ Peripheral Pulses, No clubbing, cyanosis, or edema  PSYCH: normal mood and affect, A&Ox3  NEUROLOGY: no focal neuro deficits  SKIN: No rashes or lesions Vital Signs Last 24 Hrs  T(C): 36.7 (03 Nov 2022 23:11), Max: 37 (03 Nov 2022 20:34)  T(F): 98.1 (03 Nov 2022 23:11), Max: 98.6 (03 Nov 2022 20:34)  HR: 117 (03 Nov 2022 23:53) (93 - 117)  BP: 159/92 (03 Nov 2022 23:53) (159/92 - 167/92)  RR: 16 (03 Nov 2022 23:53) (16 - 19)  SpO2: 100% (03 Nov 2022 23:53) (100% - 100%)    Parameters below as of 03 Nov 2022 23:53  Patient On (Oxygen Delivery Method): room air    PHYSICAL EXAM:  GENERAL: NAD, well-developed, well-nourished  HEAD:  Atraumatic, Normocephalic  EYES: PERRL, conjunctiva and sclera clear  NECK: Supple, No JVD  CHEST/LUNG: Clear to auscultation bilaterally; No wheezes, rales or rhonchi; normal work of breathing, speaking in full sentences  HEART: Tachycardic; Regular rhythm; No murmurs, rubs, or gallops, (+)S1, S2  ABDOMEN: Soft, Nontender, Nondistended; Normal Bowel sounds   EXTREMITIES:  2+ Peripheral Pulses, No clubbing, cyanosis, or edema  PSYCH: normal mood and affect, A&Ox3, denies SI/HI, AH/VH  NEUROLOGY: no focal neuro deficits, minimal tremor on outstretched hands, no asterixis   SKIN: No rashes or lesions

## 2022-11-04 NOTE — PROGRESS NOTE ADULT - ASSESSMENT
27-year-old male with no past medical history, presenting from home with alcohol withdrawal.  26 yo M w/ PMHx significant for alcohol use disorder and depression, p/w alcohol withdrawal.

## 2022-11-04 NOTE — ED ADULT NURSE REASSESSMENT NOTE - NS ED NURSE REASSESS COMMENT FT1
Break coverage RN: Pt A&Ox4 resting on stretcher. Respirations even and unlabored, sating 100% on RA, sinus tachycardia on monitor. Pt offers no complaints at this time. Denies any sob, CP, headache, N/V/D. Bed in lowest, safety maintained, hospitalist at bedside.

## 2022-11-04 NOTE — PROGRESS NOTE ADULT - SUBJECTIVE AND OBJECTIVE BOX
PROGRESS NOTE:     Patient is a 27y old  Male who presents with a chief complaint of alcohol withdrawal (04 Nov 2022 01:24)    SUBJECTIVE / OVERNIGHT EVENTS:  No acute events overnight. Patient seen and evaluated at bedside. No fever/chills.  Denies SOB at rest, chest pain, palpitations, abdominal pain, nausea/vomiting    MEDICATIONS  (STANDING):  chlordiazePOXIDE   Oral   chlordiazePOXIDE 50 milliGRAM(s) Oral every 6 hours  folic acid 1 milliGRAM(s) Oral daily  influenza   Vaccine 0.5 milliLiter(s) IntraMuscular once  lactated ringers. 1000 milliLiter(s) (100 mL/Hr) IV Continuous <Continuous>  multivitamin 1 Tablet(s) Oral daily  pantoprazole    Tablet 40 milliGRAM(s) Oral before breakfast  thiamine 100 milliGRAM(s) Oral daily    MEDICATIONS  (PRN):  LORazepam   Injectable 2 milliGRAM(s) IV Push every 2 hours PRN CIWA-Ar score increase by 2 points and a total score of 7 or less  LORazepam   Injectable 2 milliGRAM(s) IV Push every 1 hour PRN CIWA-Ar score 8 or greater      CAPILLARY BLOOD GLUCOSE  POCT Blood Glucose.: 124 mg/dL (04 Nov 2022 01:48)      PHYSICAL EXAM:  Vital Signs Last 24 Hrs  T(C): 36.6 (04 Nov 2022 05:43), Max: 37.2 (04 Nov 2022 04:51)  T(F): 97.8 (04 Nov 2022 05:43), Max: 98.9 (04 Nov 2022 04:51)  HR: 101 (04 Nov 2022 05:43) (93 - 118)  BP: 120/70 (04 Nov 2022 05:43) (109/60 - 167/92)  BP(mean): --  RR: 16 (04 Nov 2022 05:43) (16 - 19)  SpO2: 100% (04 Nov 2022 05:43) (100% - 100%)    Parameters below as of 04 Nov 2022 05:43  Patient On (Oxygen Delivery Method): room air      GENERAL: NAD, well-developed, well-nourished  HEAD:  Atraumatic, Normocephalic  EYES: PERRL, conjunctiva and sclera clear  NECK: Supple, No JVD  CHEST/LUNG: Clear to auscultation bilaterally; No wheezes, rales or rhonchi; normal work of breathing, speaking in full sentences  HEART: Tachycardic; Regular rhythm; No murmurs, rubs, or gallops, (+)S1, S2  ABDOMEN: Soft, Nontender, Nondistended; Normal Bowel sounds   EXTREMITIES:  2+ Peripheral Pulses, No clubbing, cyanosis, or edema  PSYCH: normal mood and affect, A&Ox3, denies SI/HI, AH/VH  NEUROLOGY: no focal neuro deficits, minimal tremor on outstretched hands, no asterixis   SKIN: No rashes or lesions    LABS:                        13.6   7.24  )-----------( 253      ( 03 Nov 2022 22:40 )             40.5     11-03    136  |  95<L>  |  13  ----------------------------<  67<L>  5.0   |  19<L>  |  0.71    Ca    10.1      03 Nov 2022 22:40  Phos  3.2     11-03  Mg     1.90     11-03    TPro  9.0<H>  /  Alb  5.3<H>  /  TBili  0.5  /  DBili  x   /  AST  52<H>  /  ALT  66<H>  /  AlkPhos  102  11-03    PT/INR - ( 03 Nov 2022 22:40 )   PT: 11.9 sec;   INR: 1.03 ratio         PTT - ( 03 Nov 2022 22:40 )  PTT:31.5 sec       PROGRESS NOTE:     Patient is a 27y old  Male who presents with a chief complaint of alcohol withdrawal (04 Nov 2022 01:24)    SUBJECTIVE / OVERNIGHT EVENTS:  Patient admitted overnight for alcohol withdrawal. Patient seen and evaluated at bedside. Appears lethargic. States that he is feeling tremulous and nauseous. No abdominal pain or vomiting. Denies auditory/visual hallucination but states that he is experiencing "crawling" sensation on skin. No chest pain, palpitations, or SOB.     MEDICATIONS  (STANDING):  chlordiazePOXIDE   Oral   chlordiazePOXIDE 50 milliGRAM(s) Oral every 6 hours  folic acid 1 milliGRAM(s) Oral daily  influenza   Vaccine 0.5 milliLiter(s) IntraMuscular once  lactated ringers. 1000 milliLiter(s) (100 mL/Hr) IV Continuous <Continuous>  multivitamin 1 Tablet(s) Oral daily  pantoprazole    Tablet 40 milliGRAM(s) Oral before breakfast  thiamine 100 milliGRAM(s) Oral daily    MEDICATIONS  (PRN):  LORazepam   Injectable 2 milliGRAM(s) IV Push every 2 hours PRN CIWA-Ar score increase by 2 points and a total score of 7 or less  LORazepam   Injectable 2 milliGRAM(s) IV Push every 1 hour PRN CIWA-Ar score 8 or greater      CAPILLARY BLOOD GLUCOSE  POCT Blood Glucose.: 124 mg/dL (04 Nov 2022 01:48)      PHYSICAL EXAM:  Vital Signs Last 24 Hrs  T(C): 36.6 (04 Nov 2022 05:43), Max: 37.2 (04 Nov 2022 04:51)  T(F): 97.8 (04 Nov 2022 05:43), Max: 98.9 (04 Nov 2022 04:51)  HR: 101 (04 Nov 2022 05:43) (93 - 118)  BP: 120/70 (04 Nov 2022 05:43) (109/60 - 167/92)  BP(mean): --  RR: 16 (04 Nov 2022 05:43) (16 - 19)  SpO2: 100% (04 Nov 2022 05:43) (100% - 100%)    Parameters below as of 04 Nov 2022 05:43  Patient On (Oxygen Delivery Method): room air      GENERAL: NAD, appears lethargic  CHEST/LUNG: Clear to auscultation bilaterally; No wheezes, rales or rhonchi   HEART: Tachycardic; Regular rhythm; No murmurs, rubs, or gallops, (+)S1, S2  ABDOMEN: Soft, Nontender, Nondistended; Normal Bowel sounds   EXTREMITIES:  2+ Peripheral Pulses, No clubbing, cyanosis, or edema  PSYCH: normal mood and affect, A&Ox3, denies SI/HI, AH/VH, +tactile hallucinations  NEUROLOGY: no focal neuro deficits, minimal tremor on outstretched hands, no asterixis   SKIN: No rashes or lesions    LABS:                        13.6   7.24  )-----------( 253      ( 03 Nov 2022 22:40 )             40.5     11-03    136  |  95<L>  |  13  ----------------------------<  67<L>  5.0   |  19<L>  |  0.71    Ca    10.1      03 Nov 2022 22:40  Phos  3.2     11-03  Mg     1.90     11-03    TPro  9.0<H>  /  Alb  5.3<H>  /  TBili  0.5  /  DBili  x   /  AST  52<H>  /  ALT  66<H>  /  AlkPhos  102  11-03    PT/INR - ( 03 Nov 2022 22:40 )   PT: 11.9 sec;   INR: 1.03 ratio         PTT - ( 03 Nov 2022 22:40 )  PTT:31.5 sec

## 2022-11-04 NOTE — H&P ADULT - PROBLEM SELECTOR PLAN 1
denies prior history of withdrawal or prior hospitalizations  last drink 11/02 12PM  c/w chlordiazepoxide taper  high risk CIWA monitoring w/PRN benzodiazepines available as well  SW consult  thiamine, MV, folic acid supplementation    #diarrhea  suspect secondary to alcohol withdrawal  stool counts   given report of melena check routine FOBT  if persistent, can check stool studies, no leukocytosis, afebrile denies prior history of withdrawal or prior hospitalizations  last drink 11/02 12PM  c/w chlordiazepoxide taper  high risk CIWA monitoring w/PRN benzodiazepines available as well  SW consult  thiamine, MV, folic acid supplementation    #diarrhea  suspect secondary to alcohol withdrawal  stool counts   given report of melena check routine FOBT, started on PPI for now  if persistent, can check stool studies, no leukocytosis, afebrile

## 2022-11-04 NOTE — PATIENT PROFILE ADULT - FALL HARM RISK - HARM RISK INTERVENTIONS
Assistance with ambulation/Assistance OOB with selected safe patient handling equipment/Communicate Risk of Fall with Harm to all staff/Monitor for mental status changes/Monitor gait and stability/Reinforce activity limits and safety measures with patient and family/Tailored Fall Risk Interventions/Toileting schedule using arm’s reach rule for commode and bathroom/Use of alarms - bed, chair and/or voice tab/Visual Cue: Yellow wristband and red socks/Bed in lowest position, wheels locked, appropriate side rails in place/Call bell, personal items and telephone in reach/Instruct patient to call for assistance before getting out of bed or chair/Non-slip footwear when patient is out of bed/[place] to call system/Physically safe environment - no spills, clutter or unnecessary equipment/Purposeful Proactive Rounding/Room/bathroom lighting operational, light cord in reach

## 2022-11-04 NOTE — PROGRESS NOTE ADULT - PROBLEM SELECTOR PLAN 4
IMPROVE SCORE 0, ambulate PRN for DVT ppx    Needs PMD referral on discharge    #low bicarb, elevated AG  possibly due to alcohol use however serum alcohol negative   repeat BMP in 4 hours  check VBG AST 52, ALT 66 on admission, likely multifactorial 2/2 heavy alcohol use and some component of hepatic steatosis ().  - Lipid panel HDL 71, triglyc 132, LDL 22.  > will CTM daily CMP   > will refer for PCP f/u upon discharge for further management/monitoring of hyperlipidemia

## 2022-11-04 NOTE — PROGRESS NOTE ADULT - PROBLEM SELECTOR PLAN 3
likely secondary to heavy alcohol use  abstain from alcohol  trend   check lipid panel Patient endorses anhedonia, insomnia, reduced appetite, and feelings of sadness over past few months. Denies SI/HI.   > will refer to [facility] d/c for further management

## 2022-11-04 NOTE — ED ADULT NURSE REASSESSMENT NOTE - NS ED NURSE REASSESS COMMENT FT1
CIWA in flowsheet. ACP (SHIRA Ceron MD) made aware, Safety precautions implemented as per protocol, awaiting further MD orders, will continue to monitor.

## 2022-11-04 NOTE — H&P ADULT - NSHPLABSRESULTS_GEN_ALL_CORE
13.6   7.24  )-----------( 253      ( 03 Nov 2022 22:40 )             40.5     11-03    136  |  95<L>  |  13  ----------------------------<  67<L>  5.0   |  19<L>  |  0.71    Ca    10.1      03 Nov 2022 22:40  Phos  3.2     11-03  Mg     1.90     11-03    TPro  9.0<H>  /  Alb  5.3<H>  /  TBili  0.5  /  DBili  x   /  AST  52<H>  /  ALT  66<H>  /  AlkPhos  102  11-03    PT/INR - ( 03 Nov 2022 22:40 )   PT: 11.9 sec;   INR: 1.03 ratio    PTT - ( 03 Nov 2022 22:40 )  PTT:31.5 sec    Alcohol, Blood: <10 mg/dL (11.03.22 @ 22:40) 13.6   7.24  )-----------( 253      ( 03 Nov 2022 22:40 )             40.5     11-03    136  |  95<L>  |  13  ----------------------------<  67<L>  5.0   |  19<L>  |  0.71    Ca    10.1      03 Nov 2022 22:40  Phos  3.2     11-03  Mg     1.90     11-03    TPro  9.0<H>  /  Alb  5.3<H>  /  TBili  0.5  /  DBili  x   /  AST  52<H>  /  ALT  66<H>  /  AlkPhos  102  11-03    PT/INR - ( 03 Nov 2022 22:40 )   PT: 11.9 sec;   INR: 1.03 ratio    PTT - ( 03 Nov 2022 22:40 )  PTT:31.5 sec    Alcohol, Blood: <10 mg/dL (11.03.22 @ 22:40)    EKG personally reviewed - 92bpm NSR, LAD, LVH, QTc 432ms

## 2022-11-04 NOTE — PROGRESS NOTE ADULT - PROBLEM SELECTOR PLAN 2
psychiatry consult in AM, reports drinking started due to depression  denies SI/HI at present Patient reporting 1 episode of melena, no current abdominal pain or N/V. Likely gastritis vs PUD i/s/o heavy alcohol consumption  > c/w PPI  > stool count  > FOBT   > continue to monitor stool color/quality

## 2022-11-05 ENCOUNTER — TRANSCRIPTION ENCOUNTER (OUTPATIENT)
Age: 27
End: 2022-11-05

## 2022-11-05 LAB
ANION GAP SERPL CALC-SCNC: 14 MMOL/L — SIGNIFICANT CHANGE UP (ref 7–14)
BUN SERPL-MCNC: 11 MG/DL — SIGNIFICANT CHANGE UP (ref 7–23)
CALCIUM SERPL-MCNC: 9.4 MG/DL — SIGNIFICANT CHANGE UP (ref 8.4–10.5)
CHLORIDE SERPL-SCNC: 101 MMOL/L — SIGNIFICANT CHANGE UP (ref 98–107)
CO2 SERPL-SCNC: 22 MMOL/L — SIGNIFICANT CHANGE UP (ref 22–31)
CREAT SERPL-MCNC: 0.76 MG/DL — SIGNIFICANT CHANGE UP (ref 0.5–1.3)
EGFR: 126 ML/MIN/1.73M2 — SIGNIFICANT CHANGE UP
GLUCOSE SERPL-MCNC: 96 MG/DL — SIGNIFICANT CHANGE UP (ref 70–99)
HCT VFR BLD CALC: 37.3 % — LOW (ref 39–50)
HGB BLD-MCNC: 11.8 G/DL — LOW (ref 13–17)
MAGNESIUM SERPL-MCNC: 1.7 MG/DL — SIGNIFICANT CHANGE UP (ref 1.6–2.6)
MCHC RBC-ENTMCNC: 29.9 PG — SIGNIFICANT CHANGE UP (ref 27–34)
MCHC RBC-ENTMCNC: 31.6 GM/DL — LOW (ref 32–36)
MCV RBC AUTO: 94.7 FL — SIGNIFICANT CHANGE UP (ref 80–100)
NRBC # BLD: 0 /100 WBCS — SIGNIFICANT CHANGE UP (ref 0–0)
NRBC # FLD: 0 K/UL — SIGNIFICANT CHANGE UP (ref 0–0)
PHOSPHATE SERPL-MCNC: 3.2 MG/DL — SIGNIFICANT CHANGE UP (ref 2.5–4.5)
PLATELET # BLD AUTO: 207 K/UL — SIGNIFICANT CHANGE UP (ref 150–400)
POTASSIUM SERPL-MCNC: 3.6 MMOL/L — SIGNIFICANT CHANGE UP (ref 3.5–5.3)
POTASSIUM SERPL-SCNC: 3.6 MMOL/L — SIGNIFICANT CHANGE UP (ref 3.5–5.3)
RBC # BLD: 3.94 M/UL — LOW (ref 4.2–5.8)
RBC # FLD: 13.4 % — SIGNIFICANT CHANGE UP (ref 10.3–14.5)
SODIUM SERPL-SCNC: 137 MMOL/L — SIGNIFICANT CHANGE UP (ref 135–145)
WBC # BLD: 6.23 K/UL — SIGNIFICANT CHANGE UP (ref 3.8–10.5)
WBC # FLD AUTO: 6.23 K/UL — SIGNIFICANT CHANGE UP (ref 3.8–10.5)

## 2022-11-05 PROCEDURE — 99233 SBSQ HOSP IP/OBS HIGH 50: CPT | Mod: GC

## 2022-11-05 PROCEDURE — 93010 ELECTROCARDIOGRAM REPORT: CPT

## 2022-11-05 RX ORDER — ACETAMINOPHEN 500 MG
650 TABLET ORAL EVERY 6 HOURS
Refills: 0 | Status: DISCONTINUED | OUTPATIENT
Start: 2022-11-05 | End: 2022-11-08

## 2022-11-05 RX ADMIN — Medication 100 MILLIGRAM(S): at 11:40

## 2022-11-05 RX ADMIN — Medication 2 MILLIGRAM(S): at 11:38

## 2022-11-05 RX ADMIN — Medication 1 TABLET(S): at 11:40

## 2022-11-05 RX ADMIN — PANTOPRAZOLE SODIUM 40 MILLIGRAM(S): 20 TABLET, DELAYED RELEASE ORAL at 05:39

## 2022-11-05 RX ADMIN — Medication 1 MILLIGRAM(S): at 22:23

## 2022-11-05 RX ADMIN — Medication 2 MILLIGRAM(S): at 19:58

## 2022-11-05 RX ADMIN — Medication 2 MILLIGRAM(S): at 04:33

## 2022-11-05 RX ADMIN — Medication 650 MILLIGRAM(S): at 07:15

## 2022-11-05 RX ADMIN — Medication 650 MILLIGRAM(S): at 06:45

## 2022-11-05 RX ADMIN — Medication 1 MILLIGRAM(S): at 11:40

## 2022-11-05 NOTE — DISCHARGE NOTE PROVIDER - NSDCCPCAREPLAN_GEN_ALL_CORE_FT
PRINCIPAL DISCHARGE DIAGNOSIS  Diagnosis: Alcohol dependence with withdrawal  Assessment and Plan of Treatment: On arrival to the hospital, you communicated that you were feeling tremulous and felt like there were insects crawling on your skin. Your symptoms were consistent with the symptoms patients experience when they are withdrawing from alcohol.      SECONDARY DISCHARGE DIAGNOSES  Diagnosis: GI bleed  Assessment and Plan of Treatment:      PRINCIPAL DISCHARGE DIAGNOSIS  Diagnosis: Alcohol dependence with withdrawal  Assessment and Plan of Treatment: On arrival to the hospital, you communicated that you were feeling tremulous and felt like there were insects crawling on your skin. Your symptoms were consistent with the symptoms patients experience when they are withdrawing from alcohol. We started you on intavenous medications and vitamins to help control your symptoms of withdrawal and avoid some of the severe consequences of withdrawal, such as confusion and seizures. You were deemed medically stable for discharge home.      SECONDARY DISCHARGE DIAGNOSES  Diagnosis: GI bleed  Assessment and Plan of Treatment: When you came to the hospital, you reported that you had been having dark stools. Giving your symptoms and findings of anemia on your bloodwork, we were concerned that perhaps you were bleeding from the gastrointestinal tract. We started you on a medication called pantoprazole which helps limit acid production in the stomach to reduce bleeding. You had no additional episodes of dark/black stool and your bloodwork remained stable. We suspect that you may possibly have gastritis or an ulcer in the stomach, likely related to alcohol consumption as large quantities of alcohol can irritate the stomach lining. This is turn can lead to small amounts of bleeding in the stomach that manifests as dark/tar-like stools.   Upon discharge, please follow-up with a primary care physician and gastroenterologist for further management. Please also continue to take your pantoprazole as prescribed. If you experience any shortness of breath, palpitations, chest pain, or large quantities of bright-red bleeding from the rectum, please seek urgent medical attention.     PRINCIPAL DISCHARGE DIAGNOSIS  Diagnosis: Alcohol dependence with withdrawal  Assessment and Plan of Treatment: On arrival to the hospital, you communicated that you were feeling tremulous and felt like there were insects crawling on your skin. Your symptoms were consistent with the symptoms patients experience when they are withdrawing from alcohol. We started you on intravenous medications and vitamins to help control your symptoms of withdrawal and avoid some of the severe consequences of withdrawal, such as confusion and seizures. You were deemed medically stable for discharge home.      SECONDARY DISCHARGE DIAGNOSES  Diagnosis: GI bleed  Assessment and Plan of Treatment: When you came to the hospital, you reported that you had been having dark stools. Giving your symptoms and findings of anemia on your bloodwork, we were concerned that perhaps you were bleeding from the gastrointestinal tract. We started you on a medication called pantoprazole which helps limit acid production in the stomach to reduce bleeding. You had no additional episodes of dark/black stool and your bloodwork remained stable. We suspect that you may possibly have gastritis or an ulcer in the stomach, likely related to alcohol consumption as large quantities of alcohol can irritate the stomach lining. This is turn can lead to small amounts of bleeding in the stomach that manifests as dark/tar-like stools.   Upon discharge, please follow-up with a primary care physician and gastroenterologist for further management. Please also continue to take your pantoprazole as prescribed. If you experience any shortness of breath, palpitations, chest pain, or large quantities of bright-red bleeding from the rectum, please seek urgent medical attention.    Diagnosis: Major depression  Assessment and Plan of Treatment: When you were hospitalized, you communicated to us that you have been experiencing symptoms that are consistent with depression. Upon dicscharge, please follow-up at the [facility] adult clinic for further management of your depression. If you are experiencing thoughts of harming yourself or others, please seek urgent medical attention.    Diagnosis: Hyperlipidemia  Assessment and Plan of Treatment: When you were hospitalized, we did some bloodwork which showed that your cholesterol levels are elevated. Please follow-up with a primary care physician upon discharge for further management of your high cholesterol.

## 2022-11-05 NOTE — PROGRESS NOTE ADULT - PROBLEM SELECTOR PLAN 3
Patient endorses anhedonia, insomnia, reduced appetite, and feelings of sadness over past few months. Denies SI/HI.   > will refer to [facility] d/c for further management

## 2022-11-05 NOTE — DISCHARGE NOTE PROVIDER - HOSPITAL COURSE
27-year-old male with prior medical history of anemia, presenting from home with alcohol withdrawal. He reports feeling tremulous, with generalized pruritis and (+)formication since 1700 last night. He drinks 1.75L of Kelly daily, last drink was Thursday 11/02 at 12PM. He had one episode of N/V yesterday, reports black stool and diarrhea for the past 2 weeks. He denies any prior history of withdrawal.    In the ED VS:  98.6    159-167/  16-19  100%RA, CIWA 6, received chlordiazepoxide 50mg PO x2   27-year-old male with prior medical history of anemia, presenting from home with alcohol withdrawal. He reports feeling tremulous, with generalized pruritis and (+)formication since 1700 last night. He drinks 1.75L of Kelly daily, last drink was Thursday 11/02 at 12PM. He had one episode of N/V yesterday, reports black stool and diarrhea for the past 2 weeks. He denies any prior history of withdrawal. In the ED VS:  98.6    159-167/  16-19  100%RA, CIWA 6, received chlordiazepoxide 50mg PO x2. Patient admitted to medicine for further management of withdrawal. Started on librium taper, found to be too sedating, subsequently switched to symptom triggered ativan taper. Also started on thiamine and folic acid. Pt noted to have mild transaminitis likely 2/2 alcohol use vs some component vs metabolic syndrome/ hepatic steatosis (). Also started on PPI w/ monitoring of stool count/quality. Pt w/ no additional episodes of melena w/ stable hgb. Pt noted to have symptoms of depression (anhedonia, loss of appetite, insomnia, feelings of sadness, no SI/HI. Pt was deemed medically stable for d/c home with outpatient PCP follow-up planned for further management of transaminitis and hyperlipidemia, GI follow-up, and a referral to [facility] for further management of depression.    27-year-old man with prior medical history of anemia, presenting from home with alcohol withdrawal. He reports feeling tremulous, with generalized pruritis and (+)formication since 1700 last night. He drinks 1.75L of Kelly daily, last drink was Thursday 11/02 at 12PM. He had one episode of N/V yesterday, reports black stool and diarrhea for the past 2 weeks. He denies any prior history of withdrawal. In the ED VS:  98.6    159-167/  16-19  100%RA, CIWA 6, received chlordiazepoxide 50mg PO x2. Patient admitted to medicine for further management of withdrawal. Started on librium taper, found to be too sedating, subsequently switched to symptom triggered ativan taper. Also started on thiamine and folic acid. Pt noted to have mild transaminitis likely 2/2 alcohol use vs some component vs metabolic syndrome/ hepatic steatosis (). Also started on PPI w/ monitoring of stool count/quality. Pt w/ no additional episodes of melena w/ stable hgb. Pt noted to have symptoms of depression (anhedonia, loss of appetite, insomnia, feelings of sadness, no SI/HI. Pt was deemed medically stable for d/c home with outpatient PCP follow-up planned for further management of transaminitis and hyperlipidemia, GI follow-up, and a referral to [facility] for further management of depression.

## 2022-11-05 NOTE — DISCHARGE NOTE PROVIDER - NSDCMRMEDTOKEN_GEN_ALL_CORE_FT
acamprosate 333 mg oral delayed release tablet: 2 tab(s) orally 3 times a day    acamprosate 333 mg oral delayed release tablet: 2 tab(s) orally 3 times a day   pantoprazole 40 mg oral delayed release tablet: 1 tab(s) orally once a day

## 2022-11-05 NOTE — PROGRESS NOTE ADULT - SUBJECTIVE AND OBJECTIVE BOX
PROGRESS NOTE:     Patient is a 27y old  Male who presents with a chief complaint of alcohol withdrawal (04 Nov 2022 07:18)    SUBJECTIVE / OVERNIGHT EVENTS:  No acute events overnight. Patient seen and evaluated at bedside. No fever/chills.  Denies SOB at rest, chest pain, palpitations, abdominal pain, nausea/vomiting    MEDICATIONS  (STANDING):  folic acid 1 milliGRAM(s) Oral daily  influenza   Vaccine 0.5 milliLiter(s) IntraMuscular once  multivitamin 1 Tablet(s) Oral daily  pantoprazole    Tablet 40 milliGRAM(s) Oral before breakfast  thiamine 100 milliGRAM(s) Oral daily    MEDICATIONS  (PRN):  acetaminophen     Tablet .. 650 milliGRAM(s) Oral every 6 hours PRN Mild Pain (1 - 3)  LORazepam   Injectable 2 milliGRAM(s) IV Push every 2 hours PRN CIWA-Ar score increase by 2 points and a total score of 7 or less  LORazepam   Injectable 2 milliGRAM(s) IV Push every 1 hour PRN CIWA-Ar score 8 or greater      PHYSICAL EXAM:  Vital Signs Last 24 Hrs  T(C): 36.5 (05 Nov 2022 06:30), Max: 36.8 (05 Nov 2022 01:00)  T(F): 97.7 (05 Nov 2022 06:30), Max: 98.2 (05 Nov 2022 01:00)  HR: 87 (05 Nov 2022 06:30) (87 - 99)  BP: 123/67 (05 Nov 2022 06:30) (112/68 - 142/92)  BP(mean): --  RR: 17 (05 Nov 2022 06:30) (15 - 19)  SpO2: 100% (05 Nov 2022 06:30) (97% - 100%)    Parameters below as of 05 Nov 2022 06:30  Patient On (Oxygen Delivery Method): room air    GENERAL: NAD, appears lethargic  CHEST/LUNG: Clear to auscultation bilaterally; No wheezes, rales or rhonchi   HEART: Tachycardic; Regular rhythm; No murmurs, rubs, or gallops, (+)S1, S2  ABDOMEN: Soft, Nontender, Nondistended; Normal Bowel sounds   EXTREMITIES:  2+ Peripheral Pulses, No clubbing, cyanosis, or edema  PSYCH: normal mood and affect, A&Ox3, denies SI/HI, AH/VH, +tactile hallucinations  NEUROLOGY: no focal neuro deficits, minimal tremor on outstretched hands, no asterixis   SKIN: No rashes or lesions    LABS:                        12.5   6.97  )-----------( 229      ( 04 Nov 2022 07:19 )             38.5     11-04    138  |  100  |  14  ----------------------------<  87  3.7   |  23  |  0.74    Ca    9.3      04 Nov 2022 07:19  Phos  3.7     11-04  Mg     2.00     11-04    TPro  7.9  /  Alb  4.6  /  TBili  0.4  /  DBili  <0.2  /  AST  40  /  ALT  52<H>  /  AlkPhos  102  11-04    PT/INR - ( 03 Nov 2022 22:40 )   PT: 11.9 sec;   INR: 1.03 ratio         PTT - ( 03 Nov 2022 22:40 )  PTT:31.5 sec         PROGRESS NOTE:     Patient is a 27y old  Male who presents with a chief complaint of alcohol withdrawal (04 Nov 2022 07:18)    SUBJECTIVE / OVERNIGHT EVENTS:  Overnight, pt w/ R-sided, charly-orbital headache, s/p tylenol. Otherwise, no acute events overnight. Patient seen and evaluated at bedside. States that headache is now improved (2/10). Denies N/V. States that he is still feeling tremulous/anxious, but that it was worse overnight. Also reports diaphoresis. No visual, auditory or tactile hallucinations. No episodes of confusion. Last BM yesterday evening - no melena, hematochezia, or diarrhea.     MEDICATIONS  (STANDING):  folic acid 1 milliGRAM(s) Oral daily  influenza   Vaccine 0.5 milliLiter(s) IntraMuscular once  multivitamin 1 Tablet(s) Oral daily  pantoprazole    Tablet 40 milliGRAM(s) Oral before breakfast  thiamine 100 milliGRAM(s) Oral daily    MEDICATIONS  (PRN):  acetaminophen     Tablet .. 650 milliGRAM(s) Oral every 6 hours PRN Mild Pain (1 - 3)  LORazepam   Injectable 2 milliGRAM(s) IV Push every 2 hours PRN CIWA-Ar score increase by 2 points and a total score of 7 or less  LORazepam   Injectable 2 milliGRAM(s) IV Push every 1 hour PRN CIWA-Ar score 8 or greater      PHYSICAL EXAM:  Vital Signs Last 24 Hrs  T(C): 36.5 (05 Nov 2022 06:30), Max: 36.8 (05 Nov 2022 01:00)  T(F): 97.7 (05 Nov 2022 06:30), Max: 98.2 (05 Nov 2022 01:00)  HR: 87 (05 Nov 2022 06:30) (87 - 99)  BP: 123/67 (05 Nov 2022 06:30) (112/68 - 142/92)  BP(mean): --  RR: 17 (05 Nov 2022 06:30) (15 - 19)  SpO2: 100% (05 Nov 2022 06:30) (97% - 100%)    Parameters below as of 05 Nov 2022 06:30  Patient On (Oxygen Delivery Method): room air    GENERAL: NAD  CHEST/LUNG: Clear to auscultation bilaterally; No wheezes, rales or rhonchi   HEART: Regular rate and rhythm; No murmurs, rubs, or gallops, (+)S1, S2  ABDOMEN: Soft, Nontender, Nondistended; Normal Bowel sounds   EXTREMITIES:  2+ Peripheral Pulses, No clubbing, cyanosis, or edema  PSYCH: normal mood and affect, A&Ox3, denies SI/HI, AH/VH or tactile hallucinations   NEUROLOGY: no focal neuro deficits, no asterixis   SKIN: No rashes or lesions    LABS:                        12.5   6.97  )-----------( 229      ( 04 Nov 2022 07:19 )             38.5     11-04    138  |  100  |  14  ----------------------------<  87  3.7   |  23  |  0.74    Ca    9.3      04 Nov 2022 07:19  Phos  3.7     11-04  Mg     2.00     11-04    TPro  7.9  /  Alb  4.6  /  TBili  0.4  /  DBili  <0.2  /  AST  40  /  ALT  52<H>  /  AlkPhos  102  11-04    PT/INR - ( 03 Nov 2022 22:40 )   PT: 11.9 sec;   INR: 1.03 ratio         PTT - ( 03 Nov 2022 22:40 )  PTT:31.5 sec

## 2022-11-05 NOTE — PROGRESS NOTE ADULT - PROBLEM SELECTOR PLAN 2
Patient reporting 1 episode of melena, no current abdominal pain or N/V. Likely gastritis vs PUD i/s/o heavy alcohol consumption  > c/w PPI  > stool count  > FOBT   > continue to monitor stool color/quality Patient reporting 1 episode of melena, no current abdominal pain or N/V. Likely gastritis vs PUD i/s/o heavy alcohol consumption  - last BM yesterday, no melena/hematochezia   > c/w PPI  > stool count  > continue to monitor stool color/quality

## 2022-11-05 NOTE — DISCHARGE NOTE PROVIDER - NSDCFUADDINST_GEN_ALL_CORE_FT
[facility] on [facility] Information:    -Walk-in hours: Monday to Friday, 9am to 3pm   -Almost all walk-in patients will be able to see a psych prescriber the same day   -Scheduled, non-urgent, evening remote/virtual appointments are available on a limited basis. Call our  to inquire about these: [phone]. A crisis center clinician screens these requests in the late afternoon and if appropriate it takes a few days to set-up.   -Visits take about 2 to 4 hours total   -Mornings are the best time for patients to arrive    For Telehealth options try:  [facility]: [email or web address] (to access psychiatrist or therapist)  [facility]: [email or web address] (to access psychiatrist or therapist)  [facility]: [email or web address] (Largest online therapy group)

## 2022-11-05 NOTE — PROGRESS NOTE ADULT - PROBLEM SELECTOR PLAN 1
States that he had been consuming 1 bottle of liquor daily for roughly 1 year. No hx of withdrawal or prior hospitalizations for withdrawal. Reporting desire to quit consuming alcohol.   - last drink 11/02 12PM  > d/c chlordiazepoxide taper as pt noted to be lethargic   > start on symptom triggered ativan taper   > c/w thiamine 100mg q3 days   > c/w folic acid supplementation States that he had been consuming 1 bottle of liquor daily for roughly 1 year. No hx of withdrawal or prior hospitalizations for withdrawal. Reporting desire to quit consuming alcohol.   - last drink 11/02 12PM  > c/w symptom triggered ativan taper   > c/w thiamine 100mg q3 days   > c/w folic acid supplementation

## 2022-11-05 NOTE — DISCHARGE NOTE PROVIDER - NSFOLLOWUPCLINICS_GEN_ALL_ED_FT
[facility] Gastroenterology  Gastroenterology  [address] [place] [address] [address]  [place] [address]  Phone: [phone]  Fax:   Follow Up Time: 2 weeks    [facility] General Internal Medicine  General Internal Medicine  [address] [place] [id number]  Phone: [phone]  Fax:   Follow Up Time: 1 week    [facility] Psychiatry  Psychiatry  [address] [place] [address]  Phone: [phone]  Fax:   Follow Up Time: 2 weeks

## 2022-11-05 NOTE — PROGRESS NOTE ADULT - PROBLEM SELECTOR PLAN 4
AST 52, ALT 66 on admission, likely multifactorial 2/2 heavy alcohol use and some component of hepatic steatosis ().  - Lipid panel HDL 71, triglyc 132, LDL 22.  > will CTM daily CMP   > will refer for PCP f/u upon discharge for further management/monitoring of hyperlipidemia

## 2022-11-05 NOTE — DISCHARGE NOTE PROVIDER - CARE PROVIDER_API CALL
[facility],   Phone: (   )    -  Fax: (   )    -  Follow Up Time:

## 2022-11-06 LAB
ALBUMIN SERPL ELPH-MCNC: 4.2 G/DL — SIGNIFICANT CHANGE UP (ref 3.3–5)
ALP SERPL-CCNC: 102 U/L — SIGNIFICANT CHANGE UP (ref 40–120)
ALT FLD-CCNC: 59 U/L — HIGH (ref 4–41)
ANION GAP SERPL CALC-SCNC: 13 MMOL/L — SIGNIFICANT CHANGE UP (ref 7–14)
AST SERPL-CCNC: 49 U/L — HIGH (ref 4–40)
BILIRUB SERPL-MCNC: <0.2 MG/DL — SIGNIFICANT CHANGE UP (ref 0.2–1.2)
BUN SERPL-MCNC: 13 MG/DL — SIGNIFICANT CHANGE UP (ref 7–23)
CALCIUM SERPL-MCNC: 9.1 MG/DL — SIGNIFICANT CHANGE UP (ref 8.4–10.5)
CHLORIDE SERPL-SCNC: 101 MMOL/L — SIGNIFICANT CHANGE UP (ref 98–107)
CO2 SERPL-SCNC: 23 MMOL/L — SIGNIFICANT CHANGE UP (ref 22–31)
CREAT SERPL-MCNC: 0.74 MG/DL — SIGNIFICANT CHANGE UP (ref 0.5–1.3)
EGFR: 127 ML/MIN/1.73M2 — SIGNIFICANT CHANGE UP
GLUCOSE SERPL-MCNC: 91 MG/DL — SIGNIFICANT CHANGE UP (ref 70–99)
HCT VFR BLD CALC: 37.5 % — LOW (ref 39–50)
HGB BLD-MCNC: 12.1 G/DL — LOW (ref 13–17)
MAGNESIUM SERPL-MCNC: 2 MG/DL — SIGNIFICANT CHANGE UP (ref 1.6–2.6)
MCHC RBC-ENTMCNC: 30.3 PG — SIGNIFICANT CHANGE UP (ref 27–34)
MCHC RBC-ENTMCNC: 32.3 GM/DL — SIGNIFICANT CHANGE UP (ref 32–36)
MCV RBC AUTO: 94 FL — SIGNIFICANT CHANGE UP (ref 80–100)
NRBC # BLD: 0 /100 WBCS — SIGNIFICANT CHANGE UP (ref 0–0)
NRBC # FLD: 0 K/UL — SIGNIFICANT CHANGE UP (ref 0–0)
PHOSPHATE SERPL-MCNC: 3.7 MG/DL — SIGNIFICANT CHANGE UP (ref 2.5–4.5)
PLATELET # BLD AUTO: 208 K/UL — SIGNIFICANT CHANGE UP (ref 150–400)
POTASSIUM SERPL-MCNC: 3.7 MMOL/L — SIGNIFICANT CHANGE UP (ref 3.5–5.3)
POTASSIUM SERPL-SCNC: 3.7 MMOL/L — SIGNIFICANT CHANGE UP (ref 3.5–5.3)
PROT SERPL-MCNC: 7.2 G/DL — SIGNIFICANT CHANGE UP (ref 6–8.3)
RBC # BLD: 3.99 M/UL — LOW (ref 4.2–5.8)
RBC # FLD: 13.2 % — SIGNIFICANT CHANGE UP (ref 10.3–14.5)
SODIUM SERPL-SCNC: 137 MMOL/L — SIGNIFICANT CHANGE UP (ref 135–145)
WBC # BLD: 7.14 K/UL — SIGNIFICANT CHANGE UP (ref 3.8–10.5)
WBC # FLD AUTO: 7.14 K/UL — SIGNIFICANT CHANGE UP (ref 3.8–10.5)

## 2022-11-06 PROCEDURE — 99233 SBSQ HOSP IP/OBS HIGH 50: CPT | Mod: GC

## 2022-11-06 RX ORDER — LANOLIN ALCOHOL/MO/W.PET/CERES
3 CREAM (GRAM) TOPICAL AT BEDTIME
Refills: 0 | Status: DISCONTINUED | OUTPATIENT
Start: 2022-11-06 | End: 2022-11-08

## 2022-11-06 RX ORDER — DIPHENHYDRAMINE HCL 50 MG
25 CAPSULE ORAL ONCE
Refills: 0 | Status: COMPLETED | OUTPATIENT
Start: 2022-11-06 | End: 2022-11-06

## 2022-11-06 RX ADMIN — Medication 3 MILLIGRAM(S): at 23:58

## 2022-11-06 RX ADMIN — Medication 1 MILLIGRAM(S): at 11:48

## 2022-11-06 RX ADMIN — Medication 100 MILLIGRAM(S): at 11:47

## 2022-11-06 RX ADMIN — Medication 1 MILLIGRAM(S): at 06:06

## 2022-11-06 RX ADMIN — Medication 2 MILLIGRAM(S): at 13:56

## 2022-11-06 RX ADMIN — Medication 1 TABLET(S): at 11:48

## 2022-11-06 RX ADMIN — Medication 2 MILLIGRAM(S): at 11:45

## 2022-11-06 RX ADMIN — Medication 2 MILLIGRAM(S): at 22:07

## 2022-11-06 RX ADMIN — Medication 2 MILLIGRAM(S): at 01:59

## 2022-11-06 RX ADMIN — Medication 25 MILLIGRAM(S): at 23:59

## 2022-11-06 RX ADMIN — PANTOPRAZOLE SODIUM 40 MILLIGRAM(S): 20 TABLET, DELAYED RELEASE ORAL at 06:05

## 2022-11-06 NOTE — PROVIDER CONTACT NOTE (OTHER) - ACTION/TREATMENT ORDERED:
MD Hogue made aware. Ativan 2 mg was given as order. Safety maintained
MD made aware and with order for EKG and ativan 1 mg po q8

## 2022-11-06 NOTE — PROVIDER CONTACT NOTE (OTHER) - SITUATION
Pt. CIWA score increased from 5 to 11. Pt. anxiety and tremors are visible. Pt. denies auditory and visual hallucination
patient c/o of chest tightness

## 2022-11-06 NOTE — PROGRESS NOTE ADULT - PROBLEM SELECTOR PLAN 3
Patient endorses anhedonia, insomnia, reduced appetite, and feelings of sadness over past few months. Denies SI/HI.   > will refer to [facility] d/c for further management

## 2022-11-06 NOTE — PROVIDER CONTACT NOTE (OTHER) - ASSESSMENT
patient c/o of chest tightness and noted to be anxious; not in acute distress, alert and responsive, v/s stable
Pt. remains AOX4 VSS.

## 2022-11-06 NOTE — PROGRESS NOTE ADULT - PROBLEM SELECTOR PLAN 2
Patient reporting 1 episode of melena, no current abdominal pain or N/V. Likely gastritis vs PUD i/s/o heavy alcohol consumption  - last BM yesterday, no melena/hematochezia   > c/w PPI  > stool count  > continue to monitor stool color/quality Patient reporting 1 episode of melena, no current abdominal pain or N/V. Likely gastritis vs PUD i/s/o heavy alcohol consumption  > c/w PPI  > stool count  > continue to monitor stool color/quality

## 2022-11-06 NOTE — PROGRESS NOTE ADULT - SUBJECTIVE AND OBJECTIVE BOX
Primo Chacon MD PGY-3  Internal Medicine Resident    CHIEF COMPLAINT: Patient is a 27y old  Male who presents with a chief complaint of alcohol withdrawal (05 Nov 2022 09:53)      INTERVAL HPI/OVERNIGHT EVENTS: MAL ON, borderline tachycardic HR , hypertensive SBP 120s-140s, otherwise VS grossly wnl. This am pt reports feeling generally well despite persistent tremors and tactile hallucination (ants crawling on arms). He relays that ON he experienced chest tightness (relayed to ON resident per RN contact note) which has completely resolved, and denies dizziness, SOB, chest pain, palpitations, arm/jaw/back pain, or other complaints.     MEDICATIONS (STANDING):  folic acid 1 milliGRAM(s) Oral daily  influenza   Vaccine 0.5 milliLiter(s) IntraMuscular once  LORazepam     Tablet 1 milliGRAM(s) Oral every 8 hours  multivitamin 1 Tablet(s) Oral daily  pantoprazole    Tablet 40 milliGRAM(s) Oral before breakfast  thiamine 100 milliGRAM(s) Oral daily    MEDICATIONS  (PRN):  acetaminophen     Tablet .. 650 milliGRAM(s) Oral every 6 hours PRN  LORazepam   Injectable 2 milliGRAM(s) IV Push every 2 hours PRN  LORazepam   Injectable 2 milliGRAM(s) IV Push every 1 hour PRN      REVIEW OF SYSTEMS:  CONSTITUTIONAL: No fever or chills  EYES: No visual disturbances or eye pain  ENMT: No sinus or throat pain  RESPIRATORY: No shortness of breath or cough  CARDIOVASCULAR: No chest pain or dizziness +chest tightness resolved as per HPI   GASTROINTESTINAL: No abdominal or epigastric pain. No diarrhea or constipation. No melena or hematochezia.   GENITOURINARY: No dysuria or hematuria  NEUROLOGICAL: No headaches, loss of strength or numbness +tactile hallucinations, tremors as per HPI   MUSCULOSKELETAL: No joint pain or swelling; No muscle, back, or extremity pain        T(F): 97.6 (11-06-22 @ 07:45), Max: 99.3 (11-05-22 @ 21:45)  HR: 88 (11-06-22 @ 07:45) (78 - 109)  BP: 130/89 (11-06-22 @ 07:45) (124/83 - 148/88)  RR: 17 (11-06-22 @ 07:45) (17 - 19)  SpO2: 100% (11-06-22 @ 07:45) (98% - 100%)  Wt(kg): --  CAPILLARY BLOOD GLUCOSE        I&O's Summary    05 Nov 2022 08:01  -  06 Nov 2022 07:00  --------------------------------------------------------  IN: 0 mL / OUT: 600 mL / NET: -600 mL        PHYSICAL EXAM:  GENERAL: Laying upright in bed (reading diet menu), in NAD, well-groomed, well-developed, pleasant to interview  HEAD: Atraumatic, Normocephalic  EYES: PERRL, conjunctiva and sclera clear  ENMT: No tonsillar erythema, exudates, or enlargement; Moist mucous membranes  NECK: Supple  NERVOUS SYSTEM: Alert & Oriented X3, Good concentration; Motor Strength 5/5 B/L upper and lower extremities +extremity tremors   CHEST/LUNG: Clear to auscultation bilaterally; No rales, rhonchi, wheezing, or rubs  HEART: +Tachycardic and regular rhythm; No murmurs, rubs, or gallops  ABDOMEN: Soft, Nontender, Nondistended; Bowel sounds present. No guarding, rebound tenderness, or rigidity.  EXTREMITIES: 2+ Peripheral Pulses, No edema      LABS:                        12.1   7.14  )-----------( 208      ( 06 Nov 2022 06:09 )             37.5     11-06    137  |  101  |  13  ----------------------------<  91  3.7   |  23  |  0.74    Ca    9.1      06 Nov 2022 06:09  Phos  3.7     11-06  Mg     2.00     11-06    TPro  7.2  /  Alb  4.2  /  TBili  <0.2  /  DBili  x   /  AST  49<H>  /  ALT  59<H>  /  AlkPhos  102  11-06            RADIOLOGY & ADDITIONAL TESTS:

## 2022-11-06 NOTE — PROGRESS NOTE ADULT - PROBLEM SELECTOR PLAN 1
States that he had been consuming 1 bottle of liquor daily for roughly 1 year. No hx of withdrawal or prior hospitalizations for withdrawal. Reporting desire to quit consuming alcohol.   - last drink 11/02 12PM  > c/w symptom triggered ativan  > c/w thiamine 100mg q3 days   > c/w folic acid supplementation States that he had been consuming 1 bottle of liquor daily for roughly 1 year. No hx of withdrawal or prior hospitalizations for withdrawal. Reporting desire to quit consuming alcohol.   - last drink 11/02 12PM  > ativan taper 2mg PO q8h today, q12h tomorrow, then can decide re further titration   > c/w thiamine 100mg q3 days   > c/w folic acid supplementation

## 2022-11-07 LAB
ALBUMIN SERPL ELPH-MCNC: 4.1 G/DL — SIGNIFICANT CHANGE UP (ref 3.3–5)
ALBUMIN SERPL ELPH-MCNC: 4.2 G/DL — SIGNIFICANT CHANGE UP (ref 3.3–5)
ALP SERPL-CCNC: 90 U/L — SIGNIFICANT CHANGE UP (ref 40–120)
ALP SERPL-CCNC: 91 U/L — SIGNIFICANT CHANGE UP (ref 40–120)
ALT FLD-CCNC: 62 U/L — HIGH (ref 4–41)
ALT FLD-CCNC: 63 U/L — HIGH (ref 4–41)
ANION GAP SERPL CALC-SCNC: 14 MMOL/L — SIGNIFICANT CHANGE UP (ref 7–14)
ANION GAP SERPL CALC-SCNC: 16 MMOL/L — HIGH (ref 7–14)
AST SERPL-CCNC: 54 U/L — HIGH (ref 4–40)
AST SERPL-CCNC: 54 U/L — HIGH (ref 4–40)
BILIRUB SERPL-MCNC: 0.3 MG/DL — SIGNIFICANT CHANGE UP (ref 0.2–1.2)
BILIRUB SERPL-MCNC: <0.2 MG/DL — SIGNIFICANT CHANGE UP (ref 0.2–1.2)
BUN SERPL-MCNC: 12 MG/DL — SIGNIFICANT CHANGE UP (ref 7–23)
BUN SERPL-MCNC: 12 MG/DL — SIGNIFICANT CHANGE UP (ref 7–23)
CALCIUM SERPL-MCNC: 9.5 MG/DL — SIGNIFICANT CHANGE UP (ref 8.4–10.5)
CALCIUM SERPL-MCNC: 9.6 MG/DL — SIGNIFICANT CHANGE UP (ref 8.4–10.5)
CHLORIDE SERPL-SCNC: 101 MMOL/L — SIGNIFICANT CHANGE UP (ref 98–107)
CHLORIDE SERPL-SCNC: 102 MMOL/L — SIGNIFICANT CHANGE UP (ref 98–107)
CO2 SERPL-SCNC: 19 MMOL/L — LOW (ref 22–31)
CO2 SERPL-SCNC: 20 MMOL/L — LOW (ref 22–31)
CREAT SERPL-MCNC: 0.75 MG/DL — SIGNIFICANT CHANGE UP (ref 0.5–1.3)
CREAT SERPL-MCNC: 0.81 MG/DL — SIGNIFICANT CHANGE UP (ref 0.5–1.3)
EGFR: 124 ML/MIN/1.73M2 — SIGNIFICANT CHANGE UP
EGFR: 127 ML/MIN/1.73M2 — SIGNIFICANT CHANGE UP
GLUCOSE SERPL-MCNC: 127 MG/DL — HIGH (ref 70–99)
GLUCOSE SERPL-MCNC: 84 MG/DL — SIGNIFICANT CHANGE UP (ref 70–99)
HCT VFR BLD CALC: 41.2 % — SIGNIFICANT CHANGE UP (ref 39–50)
HGB BLD-MCNC: 12.9 G/DL — LOW (ref 13–17)
MAGNESIUM SERPL-MCNC: 1.8 MG/DL — SIGNIFICANT CHANGE UP (ref 1.6–2.6)
MAGNESIUM SERPL-MCNC: 1.9 MG/DL — SIGNIFICANT CHANGE UP (ref 1.6–2.6)
MCHC RBC-ENTMCNC: 30.9 PG — SIGNIFICANT CHANGE UP (ref 27–34)
MCHC RBC-ENTMCNC: 31.3 GM/DL — LOW (ref 32–36)
MCV RBC AUTO: 98.6 FL — SIGNIFICANT CHANGE UP (ref 80–100)
NRBC # BLD: 0 /100 WBCS — SIGNIFICANT CHANGE UP (ref 0–0)
NRBC # FLD: 0 K/UL — SIGNIFICANT CHANGE UP (ref 0–0)
PHOSPHATE SERPL-MCNC: 3.1 MG/DL — SIGNIFICANT CHANGE UP (ref 2.5–4.5)
PHOSPHATE SERPL-MCNC: 4.7 MG/DL — HIGH (ref 2.5–4.5)
PLATELET # BLD AUTO: 156 K/UL — SIGNIFICANT CHANGE UP (ref 150–400)
POTASSIUM SERPL-MCNC: 3.6 MMOL/L — SIGNIFICANT CHANGE UP (ref 3.5–5.3)
POTASSIUM SERPL-MCNC: 3.8 MMOL/L — SIGNIFICANT CHANGE UP (ref 3.5–5.3)
POTASSIUM SERPL-SCNC: 3.6 MMOL/L — SIGNIFICANT CHANGE UP (ref 3.5–5.3)
POTASSIUM SERPL-SCNC: 3.8 MMOL/L — SIGNIFICANT CHANGE UP (ref 3.5–5.3)
PROT SERPL-MCNC: 7.2 G/DL — SIGNIFICANT CHANGE UP (ref 6–8.3)
PROT SERPL-MCNC: 7.4 G/DL — SIGNIFICANT CHANGE UP (ref 6–8.3)
RBC # BLD: 4.18 M/UL — LOW (ref 4.2–5.8)
RBC # FLD: 13.4 % — SIGNIFICANT CHANGE UP (ref 10.3–14.5)
SODIUM SERPL-SCNC: 135 MMOL/L — SIGNIFICANT CHANGE UP (ref 135–145)
SODIUM SERPL-SCNC: 137 MMOL/L — SIGNIFICANT CHANGE UP (ref 135–145)
WBC # BLD: 6.53 K/UL — SIGNIFICANT CHANGE UP (ref 3.8–10.5)
WBC # FLD AUTO: 6.53 K/UL — SIGNIFICANT CHANGE UP (ref 3.8–10.5)

## 2022-11-07 PROCEDURE — 99232 SBSQ HOSP IP/OBS MODERATE 35: CPT | Mod: GC

## 2022-11-07 RX ADMIN — Medication 2 MILLIGRAM(S): at 16:12

## 2022-11-07 RX ADMIN — Medication 3 MILLIGRAM(S): at 22:49

## 2022-11-07 RX ADMIN — Medication 2 MILLIGRAM(S): at 06:08

## 2022-11-07 RX ADMIN — Medication 1 TABLET(S): at 12:00

## 2022-11-07 RX ADMIN — Medication 2 MILLIGRAM(S): at 12:00

## 2022-11-07 RX ADMIN — Medication 1 MILLIGRAM(S): at 12:00

## 2022-11-07 RX ADMIN — PANTOPRAZOLE SODIUM 40 MILLIGRAM(S): 20 TABLET, DELAYED RELEASE ORAL at 06:08

## 2022-11-07 RX ADMIN — Medication 2 MILLIGRAM(S): at 18:16

## 2022-11-07 NOTE — PROGRESS NOTE ADULT - SUBJECTIVE AND OBJECTIVE BOX
Blaise Wheeler MD  Emergency Medicine & Internal Medicine PGY-1    PROGRESS NOTE:    ID #:     Patient is a 27y old  Male who presents with a chief complaint of alcohol withdrawal (06 Nov 2022 10:41)      MAJOR INTERVAL HOSPITAL EVENTS:     SUBJECTIVE / OVERNIGHT EVENTS: No acute overnight events.     REVIEW OF SYSTEMS:  CONSTITUTIONAL: No weakness, fevers or chills  EYES/ENT: No visual changes;  No vertigo or throat pain   NECK: No pain or stiffness  RESPIRATORY: No cough, wheezing, hemoptysis; No shortness of breath  CARDIOVASCULAR: No chest pain or palpitations  GASTROINTESTINAL: No abdominal or epigastric pain. No nausea, vomiting, or hematemesis; No diarrhea or constipation. No melena or hematochezia.  GENITOURINARY: No dysuria, frequency or hematuria  NEUROLOGICAL: No numbness or weakness  SKIN: No itching, burning, rashes, or lesions   All other review of systems is negative unless indicated above.    MEDICATIONS  (STANDING):  folic acid 1 milliGRAM(s) Oral daily  influenza   Vaccine 0.5 milliLiter(s) IntraMuscular once  LORazepam     Tablet   Oral   LORazepam     Tablet 2 milliGRAM(s) Oral every 12 hours  melatonin 3 milliGRAM(s) Oral at bedtime  multivitamin 1 Tablet(s) Oral daily  pantoprazole    Tablet 40 milliGRAM(s) Oral before breakfast    MEDICATIONS  (PRN):  acetaminophen     Tablet .. 650 milliGRAM(s) Oral every 6 hours PRN Mild Pain (1 - 3)  LORazepam     Tablet 2 milliGRAM(s) Oral every 2 hours PRN CIWA-Ar score increase by 2 points and a total score of 7 or less  LORazepam     Tablet 2 milliGRAM(s) Oral every 1 hour PRN CIWA-Ar score 8 or greater      CAPILLARY BLOOD GLUCOSE        I&O's Summary    05 Nov 2022 08:01  -  06 Nov 2022 07:00  --------------------------------------------------------  IN: 0 mL / OUT: 600 mL / NET: -600 mL        PHYSICAL EXAM:  Vital Signs Last 24 Hrs  T(C): 36.6 (07 Nov 2022 05:05), Max: 37.1 (06 Nov 2022 21:38)  T(F): 97.8 (07 Nov 2022 05:05), Max: 98.7 (06 Nov 2022 21:38)  HR: 86 (07 Nov 2022 05:05) (79 - 92)  BP: 128/80 (07 Nov 2022 05:05) (112/63 - 137/89)  BP(mean): --  RR: 17 (07 Nov 2022 05:05) (16 - 18)  SpO2: 100% (07 Nov 2022 05:05) (99% - 100%)    Parameters below as of 07 Nov 2022 05:05  Patient On (Oxygen Delivery Method): room air        GENERAL: No acute distress, well-developed  HEAD:  Atraumatic, Normocephalic  EYES: EOMI, PERRLA, conjunctiva and sclera clear  NECK: Supple, no lymphadenopathy, no JVD  CHEST/LUNG: CTAB; No wheezes, rales, or rhonchi  HEART: Regular rate and rhythm; Normal s1 and s2, No murmurs, rubs, or gallops  ABDOMEN: Soft, non-tender, non-distended; normal bowel sounds, no organomegaly  EXTREMITIES:  2+ peripheral pulses b/l, No clubbing, cyanosis, or edema  NEUROLOGY: A&O x 3, no focal deficits  SKIN: No rashes or lesions          LABS:                        12.1   7.14  )-----------( 208      ( 06 Nov 2022 06:09 )             37.5     11-06    137  |  101  |  13  ----------------------------<  91  3.7   |  23  |  0.74    Ca    9.1      06 Nov 2022 06:09  Phos  3.7     11-06  Mg     2.00     11-06    TPro  7.2  /  Alb  4.2  /  TBili  <0.2  /  DBili  x   /  AST  49<H>  /  ALT  59<H>  /  AlkPhos  102  11-06                RADIOLOGY & ADDITIONAL TESTS:  Results Reviewed:   Imaging Personally Reviewed:  Electrocardiogram Personally Reviewed:    COORDINATION OF CARE:  Care Discussed with Consultants/Other Providers [Y/N]:  Prior or Outpatient Records Reviewed [Y/N]:   Blaise Wheeler MD  Emergency Medicine & Internal Medicine PGY-1    PROGRESS NOTE:    ID #:     Patient is a 27y old  Male who presents with a chief complaint of alcohol withdrawal (06 Nov 2022 10:41)      MAJOR INTERVAL HOSPITAL EVENTS: Given x1PRN ativan and benadryl overnight.     SUBJECTIVE / OVERNIGHT EVENTS: No acute overnight events. VS wnl. Patient reports feeling well currently. Does endorse having continued tremors, hallucinations of bugs crawling over him last night, feeling  "overwhelmed" and itchy.    REVIEW OF SYSTEMS:  CONSTITUTIONAL: No weakness, fevers or chills  EYES/ENT: No visual changes;  No vertigo or throat pain   NECK: No pain or stiffness  RESPIRATORY: No cough, wheezing, hemoptysis; No shortness of breath  CARDIOVASCULAR: No chest pain or palpitations  GASTROINTESTINAL: No abdominal or epigastric pain. No nausea, vomiting, or hematemesis; No diarrhea or constipation. No melena or hematochezia.  GENITOURINARY: No dysuria, frequency or hematuria  NEUROLOGICAL: No numbness or weakness  SKIN: No itching, burning, rashes, or lesions   All other review of systems is negative unless indicated above.    MEDICATIONS  (STANDING):  folic acid 1 milliGRAM(s) Oral daily  influenza   Vaccine 0.5 milliLiter(s) IntraMuscular once  LORazepam     Tablet   Oral   LORazepam     Tablet 2 milliGRAM(s) Oral every 12 hours  melatonin 3 milliGRAM(s) Oral at bedtime  multivitamin 1 Tablet(s) Oral daily  pantoprazole    Tablet 40 milliGRAM(s) Oral before breakfast    MEDICATIONS  (PRN):  acetaminophen     Tablet .. 650 milliGRAM(s) Oral every 6 hours PRN Mild Pain (1 - 3)  LORazepam     Tablet 2 milliGRAM(s) Oral every 2 hours PRN CIWA-Ar score increase by 2 points and a total score of 7 or less  LORazepam     Tablet 2 milliGRAM(s) Oral every 1 hour PRN CIWA-Ar score 8 or greater      CAPILLARY BLOOD GLUCOSE        I&O's Summary    05 Nov 2022 08:01  -  06 Nov 2022 07:00  --------------------------------------------------------  IN: 0 mL / OUT: 600 mL / NET: -600 mL        PHYSICAL EXAM:  Vital Signs Last 24 Hrs  T(C): 36.6 (07 Nov 2022 05:05), Max: 37.1 (06 Nov 2022 21:38)  T(F): 97.8 (07 Nov 2022 05:05), Max: 98.7 (06 Nov 2022 21:38)  HR: 86 (07 Nov 2022 05:05) (79 - 92)  BP: 128/80 (07 Nov 2022 05:05) (112/63 - 137/89)  BP(mean): --  RR: 17 (07 Nov 2022 05:05) (16 - 18)  SpO2: 100% (07 Nov 2022 05:05) (99% - 100%)    Parameters below as of 07 Nov 2022 05:05  Patient On (Oxygen Delivery Method): room air        GENERAL: No acute distress, well-developed  HEAD:  Atraumatic, Normocephalic  EYES: EOMI, PERRLA, conjunctiva and sclera clear  NECK: Supple, no lymphadenopathy, no JVD  CHEST/LUNG: CTAB; No wheezes, rales, or rhonchi  HEART: Regular rate and rhythm; Normal s1 and s2, No murmurs, rubs, or gallops  ABDOMEN: Soft, non-tender, non-distended; normal bowel sounds, no organomegaly  EXTREMITIES:  2+ peripheral pulses b/l, No clubbing, cyanosis, or edema  NEUROLOGY: A&O x 3, no focal deficits  SKIN: No rashes or lesions          LABS:                        12.1   7.14  )-----------( 208      ( 06 Nov 2022 06:09 )             37.5     11-06    137  |  101  |  13  ----------------------------<  91  3.7   |  23  |  0.74    Ca    9.1      06 Nov 2022 06:09  Phos  3.7     11-06  Mg     2.00     11-06    TPro  7.2  /  Alb  4.2  /  TBili  <0.2  /  DBili  x   /  AST  49<H>  /  ALT  59<H>  /  AlkPhos  102  11-06                RADIOLOGY & ADDITIONAL TESTS:  Results Reviewed:   Imaging Personally Reviewed:  Electrocardiogram Personally Reviewed:    COORDINATION OF CARE:  Care Discussed with Consultants/Other Providers [Y/N]:  Prior or Outpatient Records Reviewed [Y/N]:   Blaise Wheeler MD  Emergency Medicine & Internal Medicine PGY-1    PROGRESS NOTE:    ID #:     Patient is a 27y old  Male who presents with a chief complaint of alcohol withdrawal (06 Nov 2022 10:41)      MAJOR INTERVAL HOSPITAL EVENTS: Given x1PRN ativan and benadryl overnight.     SUBJECTIVE / OVERNIGHT EVENTS: No acute overnight events. VS wnl. Patient reports feeling well currently. Does endorse having continued tremors, hallucinations of bugs crawling over him last night, feeling  "overwhelmed" and itchy. He otherwise denies fever, chills, CP, SOB, abd pain, nausea, vomiting, LE pain/swelling.    REVIEW OF SYSTEMS:  CONSTITUTIONAL: No weakness, fevers or chills  EYES/ENT: No visual changes;  No vertigo or throat pain   NECK: No pain or stiffness  RESPIRATORY: No cough, wheezing, hemoptysis; No shortness of breath  CARDIOVASCULAR: No chest pain or palpitations  GASTROINTESTINAL: No abdominal or epigastric pain. No nausea, vomiting, or hematemesis; No diarrhea or constipation. No melena or hematochezia.  GENITOURINARY: No dysuria, frequency or hematuria  NEUROLOGICAL: TACTILE HALLUC, TREMORS, No numbness or weakness  SKIN: No itching, burning, rashes, or lesions   PSYC: ANXIETY  All other review of systems is negative unless indicated above.    MEDICATIONS  (STANDING):  folic acid 1 milliGRAM(s) Oral daily  influenza   Vaccine 0.5 milliLiter(s) IntraMuscular once  LORazepam     Tablet   Oral   LORazepam     Tablet 2 milliGRAM(s) Oral every 12 hours  melatonin 3 milliGRAM(s) Oral at bedtime  multivitamin 1 Tablet(s) Oral daily  pantoprazole    Tablet 40 milliGRAM(s) Oral before breakfast    MEDICATIONS  (PRN):  acetaminophen     Tablet .. 650 milliGRAM(s) Oral every 6 hours PRN Mild Pain (1 - 3)  LORazepam     Tablet 2 milliGRAM(s) Oral every 2 hours PRN CIWA-Ar score increase by 2 points and a total score of 7 or less  LORazepam     Tablet 2 milliGRAM(s) Oral every 1 hour PRN CIWA-Ar score 8 or greater      CAPILLARY BLOOD GLUCOSE        I&O's Summary    05 Nov 2022 08:01  -  06 Nov 2022 07:00  --------------------------------------------------------  IN: 0 mL / OUT: 600 mL / NET: -600 mL        PHYSICAL EXAM:  Vital Signs Last 24 Hrs  T(C): 36.6 (07 Nov 2022 05:05), Max: 37.1 (06 Nov 2022 21:38)  T(F): 97.8 (07 Nov 2022 05:05), Max: 98.7 (06 Nov 2022 21:38)  HR: 86 (07 Nov 2022 05:05) (79 - 92)  BP: 128/80 (07 Nov 2022 05:05) (112/63 - 137/89)  BP(mean): --  RR: 17 (07 Nov 2022 05:05) (16 - 18)  SpO2: 100% (07 Nov 2022 05:05) (99% - 100%)    Parameters below as of 07 Nov 2022 05:05  Patient On (Oxygen Delivery Method): room air        GENERAL: No acute distress, well-developed  HEAD:  Atraumatic, Normocephalic  EYES: EOMI, PERRLA, conjunctiva and sclera clear  NECK: Supple, no lymphadenopathy, no JVD  CHEST/LUNG: CTAB; No wheezes, rales, or rhonchi  HEART: Regular rate and rhythm; Normal s1 and s2, No murmurs, rubs, or gallops  ABDOMEN: Soft, non-tender, non-distended; normal bowel sounds, no organomegaly  EXTREMITIES:  2+ peripheral pulses b/l, No clubbing, cyanosis, or edema  NEUROLOGY: TREMORS ON ARM EXTENSION, A&O x 3, no focal deficits  SKIN: No rashes or lesions          LABS:                        12.1   7.14  )-----------( 208      ( 06 Nov 2022 06:09 )             37.5     11-06    137  |  101  |  13  ----------------------------<  91  3.7   |  23  |  0.74    Ca    9.1      06 Nov 2022 06:09  Phos  3.7     11-06  Mg     2.00     11-06    TPro  7.2  /  Alb  4.2  /  TBili  <0.2  /  DBili  x   /  AST  49<H>  /  ALT  59<H>  /  AlkPhos  102  11-06                RADIOLOGY & ADDITIONAL TESTS:  Results Reviewed:   Imaging Personally Reviewed:  Electrocardiogram Personally Reviewed:    COORDINATION OF CARE:  Care Discussed with Consultants/Other Providers [Y/N]:  Prior or Outpatient Records Reviewed [Y/N]:

## 2022-11-07 NOTE — PROGRESS NOTE ADULT - PROBLEM SELECTOR PLAN 2
Patient reporting 1 episode of melena, no current abdominal pain or N/V. Likely gastritis vs PUD i/s/o heavy alcohol consumption  > c/w PPI  > stool count  > continue to monitor stool color/quality

## 2022-11-07 NOTE — SBIRT NOTE ADULT - NSSBIRTAUDITSCORE_GEN_A_CORE_CAL
33 Writer spoke with [employer] claim department. Zulma, states that the PA is still pending. Writer let her know this was to be a STAT PA and it was sent on 2/4. Writer let her know the patient only has medication until Friday. Zulma provided me with [facility] number to see if they can send another bridge script.    Writer spoke with patricia manager, Jacqui, from [facility] ([phone]). Jacqui said she spoke with [place] [employer] today and they had not received the PA info from [facility]. She said  [employer] needs to be doing their part by sending in the PA info.  [employer] re faxed PA info this morning. Jacqui will be following up with [place] [employer] tomorrow. She said as long as they are reviewing the PA they can ship the patient more medication. Jacqui is going to call writer back tomorrow and provide update.

## 2022-11-07 NOTE — PROGRESS NOTE ADULT - PROBLEM SELECTOR PLAN 3
Patient endorses anhedonia, insomnia, reduced appetite, and feelings of sadness over past few months. Denies SI/HI.   > will refer to [facility] d/c for further management

## 2022-11-07 NOTE — PROGRESS NOTE ADULT - PROBLEM SELECTOR PLAN 1
States that he had been consuming 1 bottle of liquor daily for roughly 1 year. No hx of withdrawal or prior hospitalizations for withdrawal. Reporting desire to quit consuming alcohol.   - last drink 11/02 12PM  > ativan taper 2mg PO q8h today, q12h tomorrow, then can decide re further titration   > c/w thiamine 100mg q3 days   > c/w folic acid supplementation States that he had been consuming 1 bottle of liquor daily for roughly 1 year. No hx of withdrawal or prior hospitalizations for withdrawal. Reporting desire to quit consuming alcohol.   - last drink 11/02 12PM  > ativan taper 2mg PO q12h then can decide re further titration   > c/w thiamine 100mg q3 days   > c/w folic acid supplementation

## 2022-11-07 NOTE — SBIRT NOTE ADULT - NSSBIRTALCPASSREFTXDET_GEN_A_CORE
Pt reported consistent, daily alcohol use for the last two years. Pt shared he consumes 1.75L of congac daily.  Pt reported no attempts at cessation but appeared to be in pre-contemplation stage of change sharing he is interested in receiving tx but believes he can stop use on his own. SW provided information on [facility] and a list of [facility] outpatient clinics in [place] near his zip code.

## 2022-11-08 ENCOUNTER — TRANSCRIPTION ENCOUNTER (OUTPATIENT)
Age: 27
End: 2022-11-08

## 2022-11-08 VITALS
SYSTOLIC BLOOD PRESSURE: 147 MMHG | RESPIRATION RATE: 16 BRPM | HEART RATE: 98 BPM | OXYGEN SATURATION: 100 % | DIASTOLIC BLOOD PRESSURE: 89 MMHG | TEMPERATURE: 98 F

## 2022-11-08 LAB
ALBUMIN SERPL ELPH-MCNC: 4.3 G/DL — SIGNIFICANT CHANGE UP (ref 3.3–5)
ALP SERPL-CCNC: 94 U/L — SIGNIFICANT CHANGE UP (ref 40–120)
ALT FLD-CCNC: 81 U/L — HIGH (ref 4–41)
ANION GAP SERPL CALC-SCNC: 13 MMOL/L — SIGNIFICANT CHANGE UP (ref 7–14)
AST SERPL-CCNC: 66 U/L — HIGH (ref 4–40)
BILIRUB SERPL-MCNC: <0.2 MG/DL — SIGNIFICANT CHANGE UP (ref 0.2–1.2)
BUN SERPL-MCNC: 12 MG/DL — SIGNIFICANT CHANGE UP (ref 7–23)
CALCIUM SERPL-MCNC: 9.9 MG/DL — SIGNIFICANT CHANGE UP (ref 8.4–10.5)
CHLORIDE SERPL-SCNC: 101 MMOL/L — SIGNIFICANT CHANGE UP (ref 98–107)
CO2 SERPL-SCNC: 23 MMOL/L — SIGNIFICANT CHANGE UP (ref 22–31)
CREAT SERPL-MCNC: 0.85 MG/DL — SIGNIFICANT CHANGE UP (ref 0.5–1.3)
EGFR: 122 ML/MIN/1.73M2 — SIGNIFICANT CHANGE UP
GLUCOSE SERPL-MCNC: 86 MG/DL — SIGNIFICANT CHANGE UP (ref 70–99)
HCT VFR BLD CALC: 39.4 % — SIGNIFICANT CHANGE UP (ref 39–50)
HGB BLD-MCNC: 12.5 G/DL — LOW (ref 13–17)
MCHC RBC-ENTMCNC: 30.1 PG — SIGNIFICANT CHANGE UP (ref 27–34)
MCHC RBC-ENTMCNC: 31.7 GM/DL — LOW (ref 32–36)
MCV RBC AUTO: 94.9 FL — SIGNIFICANT CHANGE UP (ref 80–100)
NRBC # BLD: 0 /100 WBCS — SIGNIFICANT CHANGE UP (ref 0–0)
NRBC # FLD: 0 K/UL — SIGNIFICANT CHANGE UP (ref 0–0)
PLATELET # BLD AUTO: 223 K/UL — SIGNIFICANT CHANGE UP (ref 150–400)
POTASSIUM SERPL-MCNC: 3.7 MMOL/L — SIGNIFICANT CHANGE UP (ref 3.5–5.3)
POTASSIUM SERPL-SCNC: 3.7 MMOL/L — SIGNIFICANT CHANGE UP (ref 3.5–5.3)
PROT SERPL-MCNC: 7.4 G/DL — SIGNIFICANT CHANGE UP (ref 6–8.3)
RBC # BLD: 4.15 M/UL — LOW (ref 4.2–5.8)
RBC # FLD: 13.2 % — SIGNIFICANT CHANGE UP (ref 10.3–14.5)
SODIUM SERPL-SCNC: 137 MMOL/L — SIGNIFICANT CHANGE UP (ref 135–145)
WBC # BLD: 7.49 K/UL — SIGNIFICANT CHANGE UP (ref 3.8–10.5)
WBC # FLD AUTO: 7.49 K/UL — SIGNIFICANT CHANGE UP (ref 3.8–10.5)

## 2022-11-08 PROCEDURE — 99239 HOSP IP/OBS DSCHRG MGMT >30: CPT | Mod: GC

## 2022-11-08 RX ORDER — PANTOPRAZOLE SODIUM 20 MG/1
1 TABLET, DELAYED RELEASE ORAL
Qty: 30 | Refills: 0
Start: 2022-11-08 | End: 2022-12-07

## 2022-11-08 RX ORDER — ACAMPROSATE CALCIUM 333 MG/1
2 TABLET, DELAYED RELEASE ORAL
Qty: 180 | Refills: 0
Start: 2022-11-08 | End: 2022-12-07

## 2022-11-08 RX ADMIN — Medication 2 MILLIGRAM(S): at 06:09

## 2022-11-08 RX ADMIN — PANTOPRAZOLE SODIUM 40 MILLIGRAM(S): 20 TABLET, DELAYED RELEASE ORAL at 06:10

## 2022-11-08 NOTE — ED PROVIDER NOTE - RESPIRATORY NEGATIVE STATEMENT, MLM
----- Message from Shelli Villavicencio sent at 11/7/2022  9:40 AM CST -----  Results on bone density and pap     no chest pain, no cough, and no shortness of breath.

## 2022-11-08 NOTE — PROGRESS NOTE ADULT - PROBLEM SELECTOR PLAN 3
Patient endorses anhedonia, insomnia, reduced appetite, and feelings of sadness over past few months. Denies SI/HI.   > will refer to [facility] d/c for further management

## 2022-11-08 NOTE — DISCHARGE NOTE NURSING/CASE MANAGEMENT/SOCIAL WORK - PATIENT PORTAL LINK FT
You can access the FollowMyHealth Patient Portal offered by [facility] by registering at the following website: [email or web address]. By joining [employer]’s FollowMyHealth portal, you will also be able to view your health information using other applications (apps) compatible with our system.

## 2022-11-08 NOTE — PROGRESS NOTE ADULT - PROBLEM SELECTOR PLAN 2
Patient reporting 1 episode of melena, no current abdominal pain or N/V. Likely gastritis vs PUD i/s/o heavy alcohol consumption  > c/w PPI  > stool count  > continue to monitor stool color/quality Patient reporting 1 episode of melena, no current abdominal pain or N/V. Likely gastritis vs PUD i/s/o heavy alcohol consumption  > c/w PPI, will send prescription  > stool count  > continue to monitor stool color/quality

## 2022-11-08 NOTE — PROGRESS NOTE ADULT - PROBLEM SELECTOR PROBLEM 1
Alcohol dependence with withdrawal

## 2022-11-08 NOTE — PROGRESS NOTE ADULT - PROBLEM SELECTOR PLAN 1
States that he had been consuming 1 bottle of liquor daily for roughly 1 year. No hx of withdrawal or prior hospitalizations for withdrawal. Reporting desire to quit consuming alcohol.   - last drink 11/02 12PM  > ativan taper 2mg PO q12h then can decide re further titration   > c/w thiamine 100mg q3 days   > c/w folic acid supplementation States that he had been consuming 1 bottle of liquor daily for roughly 1 year. No hx of withdrawal or prior hospitalizations for withdrawal. Reporting desire to quit consuming alcohol.   - last drink 11/02 12PM  > ativan taper completed  > c/w thiamine 100mg q3 days   > c/w folic acid supplementation  > send home on acamprosate

## 2022-11-08 NOTE — PROGRESS NOTE ADULT - PROBLEM SELECTOR PLAN 4
AST 52, ALT 66 on admission, likely multifactorial 2/2 heavy alcohol use and some component of hepatic steatosis ().  - Lipid panel HDL 71, triglyc 132, LDL 22.  > will CTM daily CMP   > will refer for PCP f/u upon discharge for further management/monitoring of hyperlipidemia Bcc Histology Text: There were numerous aggregates of basaloid cells.

## 2022-11-08 NOTE — DISCHARGE NOTE NURSING/CASE MANAGEMENT/SOCIAL WORK - NSDCPEFALRISK_GEN_ALL_CORE
For information on Fall & Injury Prevention, visit: [email or web address] OR  [email or web address] OR  https://www.cdc.gov/steadi/patient.html

## 2022-11-08 NOTE — PROGRESS NOTE ADULT - PROBLEM SELECTOR PLAN 5
DVT ppx: IMPROVE SCORE 0, no need for chemical DVT pxx  Diet: DASH/TLC   Dispo: pending treatment of withdrawal

## 2022-11-08 NOTE — PROGRESS NOTE ADULT - ASSESSMENT
28 yo M w/ PMHx significant for alcohol use disorder and depression, p/w alcohol withdrawal.  26 yo M w/ PMHx significant for alcohol use disorder and depression, p/w alcohol withdrawal. To be DC'd today 11/8 on acamprosate and protonix

## 2022-11-08 NOTE — PROGRESS NOTE ADULT - ATTENDING COMMENTS
26 yo man with pmhx of alcohol use disorder and depression presents with symptoms of tremors, nausea and express wishes to help with quitting alcohol use. Last drink was on Thursday 11/3. No prior hx of severe withdrawal symptoms.     #AUD with withdrawal. etoh level <10 on admission. CIWA 4-5 today per nursing; driven by tremors, which terminate when patient is distracted. Patient is now out of window of withdrawal. Patient not requiring PRNs.  - d/c CIWA protocol, patient has completed taper and is out of withdrawal window  - cont with thiamine, mvi and folate  - patient counseled by OLEKSANDR on rehab/detox options in community  -d/c planning    I spent 35 minutes counseling this patient and coordinating their discharge.
26 yo male with pmhx of alcohol use disorder and depression presents with symptoms of tremors, nausea and express wishes to help with quitting alcohol use. last drink was on Thursday 11/3. No prior hx of severe withdrawal symptoms.     #AUD with withdrawal. etoh level <10 on admission. CIWA 2-5 today.   - not requiring PRNs, mild tremulousness on exam  - continue w/Ativan taper and CIWA protocol  - cont with thiamine, mvi and folate  - monitor CIWA  - monitor on tele  - pt is interested rehab/detox; SW/CM following
28 yo male with pmhx of alcohol use disorder and depression presents with symptoms of tremors, nausea and express wishes to help with quitting alcohol use. last drink was on thursday 11/3. No prior hx of severe withdrawal symptoms.       #AUD with withdrawal. etoh level <10 on admission. CIWA 6-8  - agree with standing ativan taper, 2mg po q8hr today-- q12hr tomorrow.  - cont with thiamine, mvi and folate  - monitor CIWA  - monitor on tele  - pt is interested in inpatient rehab/detox    dispo: fu with sw regarding inpatient detox program
28 yo male with pmhx of alcohol use disorder and depression presents with symptoms of tremors, nausea and express wishes to help with quitting alcohol use. last drink was on thursday 11/3. No prior hx of severe withdrawal symptoms.     comfortable this morning. reports symptoms of whole body itchiness, mild nausea and tremors. CIWA throughout the night 5-6, required ativan once last night.     #AUD with withdrawal. etoh level <10 on admission. CIWA 6-8  - Cont with symptom trigerred ativan prn   - if increasing ativan requirement then will consider standing ativan 2mg q8hr or librium 25mg q8hr  - cont with thiamine, mvi and folate  - monitor CIWA  - monitor on tele  - pt is interested in inpatient rehab
27M with PMH of AUD who is presenting to the hospital with ETOH withdrawal.    # AUD   # ETOH withdrawal   # Elevated transaminases   - patient sedated this morning while on the librium taper and difficult to arouse   - d/c taper and transition to symptom triggered ativan   - continue to monitor CIWAs closely  - MVI, folate, thiamine  - [facility] consult

## 2022-11-08 NOTE — PROGRESS NOTE ADULT - SUBJECTIVE AND OBJECTIVE BOX
Blaise Wheeler MD  Emergency Medicine & Internal Medicine PGY-1    PROGRESS NOTE:    ID #:     Patient is a 27y old  Male who presents with a chief complaint of alcohol withdrawal (07 Nov 2022 06:38)      MAJOR INTERVAL HOSPITAL EVENTS:     SUBJECTIVE / OVERNIGHT EVENTS: No acute overnight events.     REVIEW OF SYSTEMS:  CONSTITUTIONAL: No weakness, fevers or chills  EYES/ENT: No visual changes;  No vertigo or throat pain   NECK: No pain or stiffness  RESPIRATORY: No cough, wheezing, hemoptysis; No shortness of breath  CARDIOVASCULAR: No chest pain or palpitations  GASTROINTESTINAL: No abdominal or epigastric pain. No nausea, vomiting, or hematemesis; No diarrhea or constipation. No melena or hematochezia.  GENITOURINARY: No dysuria, frequency or hematuria  NEUROLOGICAL: No numbness or weakness  SKIN: No itching, burning, rashes, or lesions   All other review of systems is negative unless indicated above.    MEDICATIONS  (STANDING):  folic acid 1 milliGRAM(s) Oral daily  influenza   Vaccine 0.5 milliLiter(s) IntraMuscular once  melatonin 3 milliGRAM(s) Oral at bedtime  multivitamin 1 Tablet(s) Oral daily  pantoprazole    Tablet 40 milliGRAM(s) Oral before breakfast    MEDICATIONS  (PRN):  acetaminophen     Tablet .. 650 milliGRAM(s) Oral every 6 hours PRN Mild Pain (1 - 3)  LORazepam     Tablet 2 milliGRAM(s) Oral every 2 hours PRN CIWA-Ar score increase by 2 points and a total score of 7 or less  LORazepam     Tablet 2 milliGRAM(s) Oral every 1 hour PRN CIWA-Ar score 8 or greater      CAPILLARY BLOOD GLUCOSE        I&O's Summary      PHYSICAL EXAM:  Vital Signs Last 24 Hrs  T(C): 36.8 (08 Nov 2022 06:00), Max: 36.9 (07 Nov 2022 13:00)  T(F): 98.3 (08 Nov 2022 06:00), Max: 98.5 (07 Nov 2022 13:00)  HR: 76 (08 Nov 2022 06:00) (76 - 98)  BP: 121/69 (08 Nov 2022 06:00) (109/64 - 143/84)  BP(mean): --  RR: 16 (08 Nov 2022 06:00) (16 - 17)  SpO2: 99% (08 Nov 2022 06:00) (98% - 100%)    Parameters below as of 08 Nov 2022 06:00  Patient On (Oxygen Delivery Method): room air        GENERAL: No acute distress, well-developed  HEAD:  Atraumatic, Normocephalic  EYES: EOMI, PERRLA, conjunctiva and sclera clear  NECK: Supple, no lymphadenopathy, no JVD  CHEST/LUNG: CTAB; No wheezes, rales, or rhonchi  HEART: Regular rate and rhythm; Normal s1 and s2, No murmurs, rubs, or gallops  ABDOMEN: Soft, non-tender, non-distended; normal bowel sounds, no organomegaly  EXTREMITIES:  2+ peripheral pulses b/l, No clubbing, cyanosis, or edema  NEUROLOGY: A&O x 3, no focal deficits  SKIN: No rashes or lesions          LABS:                        12.9   6.53  )-----------( 156      ( 07 Nov 2022 06:50 )             41.2     11-07    135  |  102  |  12  ----------------------------<  127<H>  3.6   |  19<L>  |  0.75    Ca    9.5      07 Nov 2022 09:22  Phos  3.1     11-07  Mg     1.80     11-07    TPro  7.4  /  Alb  4.2  /  TBili  0.3  /  DBili  x   /  AST  54<H>  /  ALT  63<H>  /  AlkPhos  91  11-07                RADIOLOGY & ADDITIONAL TESTS:  Results Reviewed:   Imaging Personally Reviewed:  Electrocardiogram Personally Reviewed:    COORDINATION OF CARE:  Care Discussed with Consultants/Other Providers [Y/N]:  Prior or Outpatient Records Reviewed [Y/N]:   Blaise Wheeler MD  Emergency Medicine & Internal Medicine PGY-1    PROGRESS NOTE:    ID #:     Patient is a 27y old  Male who presents with a chief complaint of alcohol withdrawal (07 Nov 2022 06:38)      MAJOR INTERVAL HOSPITAL EVENTS: NAEO    SUBJECTIVE / OVERNIGHT EVENTS: No acute overnight events. Patient complained of tactile hallucinations, tremors, and agitation overnight. RN noted that symptoms ceased while patient distracted. No PRN ativan given. Patient otherwise denies fever, HA, CP, SOB, abd pain, n/v    REVIEW OF SYSTEMS:  CONSTITUTIONAL: No weakness, fevers or chills  EYES/ENT: No visual changes;  No vertigo or throat pain   NECK: No pain or stiffness  RESPIRATORY: No cough, wheezing, hemoptysis; No shortness of breath  CARDIOVASCULAR: No chest pain or palpitations  GASTROINTESTINAL: No abdominal or epigastric pain. No nausea, vomiting, or hematemesis; No diarrhea or constipation. No melena or hematochezia.  GENITOURINARY: No dysuria, frequency or hematuria  NEUROLOGICAL: TACTILE HALLUC, TREMORS, No numbness or weakness  SKIN: No itching, burning, rashes, or lesions   PSYC: ANXIETY  All other review of systems is negative unless indicated above.    MEDICATIONS  (STANDING):  folic acid 1 milliGRAM(s) Oral daily  influenza   Vaccine 0.5 milliLiter(s) IntraMuscular once  melatonin 3 milliGRAM(s) Oral at bedtime  multivitamin 1 Tablet(s) Oral daily  pantoprazole    Tablet 40 milliGRAM(s) Oral before breakfast    MEDICATIONS  (PRN):  acetaminophen     Tablet .. 650 milliGRAM(s) Oral every 6 hours PRN Mild Pain (1 - 3)  LORazepam     Tablet 2 milliGRAM(s) Oral every 2 hours PRN CIWA-Ar score increase by 2 points and a total score of 7 or less  LORazepam     Tablet 2 milliGRAM(s) Oral every 1 hour PRN CIWA-Ar score 8 or greater      CAPILLARY BLOOD GLUCOSE        I&O's Summary      PHYSICAL EXAM:  Vital Signs Last 24 Hrs  T(C): 36.8 (08 Nov 2022 06:00), Max: 36.9 (07 Nov 2022 13:00)  T(F): 98.3 (08 Nov 2022 06:00), Max: 98.5 (07 Nov 2022 13:00)  HR: 76 (08 Nov 2022 06:00) (76 - 98)  BP: 121/69 (08 Nov 2022 06:00) (109/64 - 143/84)  BP(mean): --  RR: 16 (08 Nov 2022 06:00) (16 - 17)  SpO2: 99% (08 Nov 2022 06:00) (98% - 100%)    Parameters below as of 08 Nov 2022 06:00  Patient On (Oxygen Delivery Method): room air      GENERAL: No acute distress, well-developed  HEAD:  Atraumatic, Normocephalic  EYES: EOMI, PERRLA, conjunctiva and sclera clear  NECK: Supple, no lymphadenopathy, no JVD  CHEST/LUNG: CTAB; No wheezes, rales, or rhonchi  HEART: Regular rate and rhythm; Normal s1 and s2, No murmurs, rubs, or gallops  ABDOMEN: Soft, non-tender, non-distended; normal bowel sounds, no organomegaly  EXTREMITIES:  2+ peripheral pulses b/l, No clubbing, cyanosis, or edema  NEUROLOGY: TREMORS ON ARM EXTENSION, A&O x 3, no focal deficits  SKIN: No rashes or lesions          LABS:                        12.9   6.53  )-----------( 156      ( 07 Nov 2022 06:50 )             41.2     11-07    135  |  102  |  12  ----------------------------<  127<H>  3.6   |  19<L>  |  0.75    Ca    9.5      07 Nov 2022 09:22  Phos  3.1     11-07  Mg     1.80     11-07    TPro  7.4  /  Alb  4.2  /  TBili  0.3  /  DBili  x   /  AST  54<H>  /  ALT  63<H>  /  AlkPhos  91  11-07                RADIOLOGY & ADDITIONAL TESTS:  Results Reviewed:   Imaging Personally Reviewed:  Electrocardiogram Personally Reviewed:    COORDINATION OF CARE:  Care Discussed with Consultants/Other Providers [Y/N]:  Prior or Outpatient Records Reviewed [Y/N]:   Blaise Wheeler MD  Emergency Medicine & Internal Medicine PGY-1    PROGRESS NOTE:    ID #:       Patient is a 27y old  Male who presents with a chief complaint of alcohol withdrawal (07 Nov 2022 06:38)      MAJOR INTERVAL HOSPITAL EVENTS: NAEO    SUBJECTIVE / OVERNIGHT EVENTS: No acute overnight events. Patient complained of tactile hallucinations, tremors, and agitation overnight. RN noted that symptoms ceased while patient distracted. No PRN ativan given. Patient otherwise denies fever, HA, CP, SOB, abd pain, n/v    REVIEW OF SYSTEMS:  CONSTITUTIONAL: No weakness, fevers or chills  EYES/ENT: No visual changes;  No vertigo or throat pain   NECK: No pain or stiffness  RESPIRATORY: No cough, wheezing, hemoptysis; No shortness of breath  CARDIOVASCULAR: No chest pain or palpitations  GASTROINTESTINAL: No abdominal or epigastric pain. No nausea, vomiting, or hematemesis; No diarrhea or constipation. No melena or hematochezia.  GENITOURINARY: No dysuria, frequency or hematuria  NEUROLOGICAL: TACTILE HALLUC, TREMORS, No numbness or weakness  SKIN: No itching, burning, rashes, or lesions   PSYC: ANXIETY  All other review of systems is negative unless indicated above.    MEDICATIONS  (STANDING):  folic acid 1 milliGRAM(s) Oral daily  influenza   Vaccine 0.5 milliLiter(s) IntraMuscular once  melatonin 3 milliGRAM(s) Oral at bedtime  multivitamin 1 Tablet(s) Oral daily  pantoprazole    Tablet 40 milliGRAM(s) Oral before breakfast    MEDICATIONS  (PRN):  acetaminophen     Tablet .. 650 milliGRAM(s) Oral every 6 hours PRN Mild Pain (1 - 3)  LORazepam     Tablet 2 milliGRAM(s) Oral every 2 hours PRN CIWA-Ar score increase by 2 points and a total score of 7 or less  LORazepam     Tablet 2 milliGRAM(s) Oral every 1 hour PRN CIWA-Ar score 8 or greater      CAPILLARY BLOOD GLUCOSE        I&O's Summary      PHYSICAL EXAM:  Vital Signs Last 24 Hrs  T(C): 36.8 (08 Nov 2022 06:00), Max: 36.9 (07 Nov 2022 13:00)  T(F): 98.3 (08 Nov 2022 06:00), Max: 98.5 (07 Nov 2022 13:00)  HR: 76 (08 Nov 2022 06:00) (76 - 98)  BP: 121/69 (08 Nov 2022 06:00) (109/64 - 143/84)  BP(mean): --  RR: 16 (08 Nov 2022 06:00) (16 - 17)  SpO2: 99% (08 Nov 2022 06:00) (98% - 100%)    Parameters below as of 08 Nov 2022 06:00  Patient On (Oxygen Delivery Method): room air      GENERAL: No acute distress, well-developed  HEAD:  Atraumatic, Normocephalic  EYES: EOMI, PERRLA, conjunctiva and sclera clear  NECK: Supple, no lymphadenopathy, no JVD  CHEST/LUNG: CTAB; No wheezes, rales, or rhonchi  HEART: Regular rate and rhythm; Normal s1 and s2, No murmurs, rubs, or gallops  ABDOMEN: Soft, non-tender, non-distended; normal bowel sounds, no organomegaly  EXTREMITIES:  2+ peripheral pulses b/l, No clubbing, cyanosis, or edema  NEUROLOGY: TREMORS ON ARM EXTENSION, A&O x 3, no focal deficits  SKIN: No rashes or lesions          LABS:                        12.9   6.53  )-----------( 156      ( 07 Nov 2022 06:50 )             41.2     11-07    135  |  102  |  12  ----------------------------<  127<H>  3.6   |  19<L>  |  0.75    Ca    9.5      07 Nov 2022 09:22  Phos  3.1     11-07  Mg     1.80     11-07    TPro  7.4  /  Alb  4.2  /  TBili  0.3  /  DBili  x   /  AST  54<H>  /  ALT  63<H>  /  AlkPhos  91  11-07                RADIOLOGY & ADDITIONAL TESTS:  Results Reviewed:   Imaging Personally Reviewed:  Electrocardiogram Personally Reviewed:    COORDINATION OF CARE:  Care Discussed with Consultants/Other Providers [Y/N]:  Prior or Outpatient Records Reviewed [Y/N]:

## 2024-06-23 ENCOUNTER — EMERGENCY (EMERGENCY)
Facility: HOSPITAL | Age: 29
LOS: 1 days | Discharge: HOME | End: 2024-06-23
Attending: EMERGENCY MEDICINE | Admitting: EMERGENCY MEDICINE
Payer: MEDICAID

## 2024-06-23 VITALS
TEMPERATURE: 98 F | OXYGEN SATURATION: 99 % | RESPIRATION RATE: 18 BRPM | DIASTOLIC BLOOD PRESSURE: 60 MMHG | SYSTOLIC BLOOD PRESSURE: 118 MMHG | HEART RATE: 84 BPM

## 2024-06-23 DIAGNOSIS — Z98.890 OTHER SPECIFIED POSTPROCEDURAL STATES: Chronic | ICD-10-CM

## 2024-06-23 DIAGNOSIS — Z90.49 ACQUIRED ABSENCE OF OTHER SPECIFIED PARTS OF DIGESTIVE TRACT: Chronic | ICD-10-CM

## 2024-06-23 PROBLEM — D64.9 ANEMIA, UNSPECIFIED: Chronic | Status: ACTIVE | Noted: 2022-11-04

## 2024-06-23 LAB
ADD ON TEST-SPECIMEN IN LAB: SIGNIFICANT CHANGE UP
ALBUMIN SERPL ELPH-MCNC: 3.9 G/DL — SIGNIFICANT CHANGE UP (ref 3.3–5)
ALP SERPL-CCNC: 88 U/L — SIGNIFICANT CHANGE UP (ref 40–120)
ALT FLD-CCNC: 13 U/L — SIGNIFICANT CHANGE UP (ref 4–41)
ANION GAP SERPL CALC-SCNC: 13 MMOL/L — SIGNIFICANT CHANGE UP (ref 7–14)
ANISOCYTOSIS BLD QL: SLIGHT — SIGNIFICANT CHANGE UP
AST SERPL-CCNC: 21 U/L — SIGNIFICANT CHANGE UP (ref 4–40)
BASOPHILS # BLD AUTO: 0 K/UL — SIGNIFICANT CHANGE UP (ref 0–0.2)
BASOPHILS NFR BLD AUTO: 0 % — SIGNIFICANT CHANGE UP (ref 0–2)
BILIRUB SERPL-MCNC: 0.2 MG/DL — SIGNIFICANT CHANGE UP (ref 0.2–1.2)
BUN SERPL-MCNC: 12 MG/DL — SIGNIFICANT CHANGE UP (ref 7–23)
CALCIUM SERPL-MCNC: 8.9 MG/DL — SIGNIFICANT CHANGE UP (ref 8.4–10.5)
CHLORIDE SERPL-SCNC: 102 MMOL/L — SIGNIFICANT CHANGE UP (ref 98–107)
CO2 SERPL-SCNC: 19 MMOL/L — LOW (ref 22–31)
CREAT SERPL-MCNC: 0.72 MG/DL — SIGNIFICANT CHANGE UP (ref 0.5–1.3)
EGFR: 127 ML/MIN/1.73M2 — SIGNIFICANT CHANGE UP
EOSINOPHIL # BLD AUTO: 0.23 K/UL — SIGNIFICANT CHANGE UP (ref 0–0.5)
EOSINOPHIL NFR BLD AUTO: 2.7 % — SIGNIFICANT CHANGE UP (ref 0–6)
GIANT PLATELETS BLD QL SMEAR: PRESENT — SIGNIFICANT CHANGE UP
GLUCOSE SERPL-MCNC: 99 MG/DL — SIGNIFICANT CHANGE UP (ref 70–99)
HCT VFR BLD CALC: 35.4 % — LOW (ref 39–50)
HGB BLD-MCNC: 11.7 G/DL — LOW (ref 13–17)
IANC: 3.94 K/UL — SIGNIFICANT CHANGE UP (ref 1.8–7.4)
LYMPHOCYTES # BLD AUTO: 3.19 K/UL — SIGNIFICANT CHANGE UP (ref 1–3.3)
LYMPHOCYTES # BLD AUTO: 37.2 % — SIGNIFICANT CHANGE UP (ref 13–44)
MCHC RBC-ENTMCNC: 30.1 PG — SIGNIFICANT CHANGE UP (ref 27–34)
MCHC RBC-ENTMCNC: 33.1 GM/DL — SIGNIFICANT CHANGE UP (ref 32–36)
MCV RBC AUTO: 91 FL — SIGNIFICANT CHANGE UP (ref 80–100)
MONOCYTES # BLD AUTO: 1.52 K/UL — HIGH (ref 0–0.9)
MONOCYTES NFR BLD AUTO: 17.7 % — HIGH (ref 2–14)
NEUTROPHILS # BLD AUTO: 3.26 K/UL — SIGNIFICANT CHANGE UP (ref 1.8–7.4)
NEUTROPHILS NFR BLD AUTO: 38 % — LOW (ref 43–77)
PLAT MORPH BLD: NORMAL — SIGNIFICANT CHANGE UP
PLATELET # BLD AUTO: 187 K/UL — SIGNIFICANT CHANGE UP (ref 150–400)
PLATELET COUNT - ESTIMATE: NORMAL — SIGNIFICANT CHANGE UP
POTASSIUM SERPL-MCNC: 4.3 MMOL/L — SIGNIFICANT CHANGE UP (ref 3.5–5.3)
POTASSIUM SERPL-SCNC: 4.3 MMOL/L — SIGNIFICANT CHANGE UP (ref 3.5–5.3)
PROT SERPL-MCNC: 7 G/DL — SIGNIFICANT CHANGE UP (ref 6–8.3)
RBC # BLD: 3.89 M/UL — LOW (ref 4.2–5.8)
RBC # FLD: 13.5 % — SIGNIFICANT CHANGE UP (ref 10.3–14.5)
RBC BLD AUTO: NORMAL — SIGNIFICANT CHANGE UP
SODIUM SERPL-SCNC: 134 MMOL/L — LOW (ref 135–145)
VARIANT LYMPHS # BLD: 4.4 % — SIGNIFICANT CHANGE UP (ref 0–6)
WBC # BLD: 8.57 K/UL — SIGNIFICANT CHANGE UP (ref 3.8–10.5)
WBC # FLD AUTO: 8.57 K/UL — SIGNIFICANT CHANGE UP (ref 3.8–10.5)

## 2024-06-23 PROCEDURE — 99223 1ST HOSP IP/OBS HIGH 75: CPT

## 2024-06-23 PROCEDURE — 70487 CT MAXILLOFACIAL W/DYE: CPT | Mod: 26,MC

## 2024-06-23 RX ORDER — KETOROLAC TROMETHAMINE 30 MG/ML
15 SYRINGE (ML) INJECTION EVERY 6 HOURS
Refills: 0 | Status: DISCONTINUED | OUTPATIENT
Start: 2024-06-23 | End: 2024-06-24

## 2024-06-23 RX ORDER — AMPICILLIN SODIUM AND SULBACTAM SODIUM 250; 125 MG/ML; MG/ML
3 INJECTION, POWDER, FOR SUSPENSION INTRAMUSCULAR; INTRAVENOUS ONCE
Refills: 0 | Status: COMPLETED | OUTPATIENT
Start: 2024-06-23 | End: 2024-06-23

## 2024-06-23 RX ORDER — KETOROLAC TROMETHAMINE 30 MG/ML
30 SYRINGE (ML) INJECTION ONCE
Refills: 0 | Status: DISCONTINUED | OUTPATIENT
Start: 2024-06-23 | End: 2024-06-23

## 2024-06-23 RX ORDER — AMPICILLIN SODIUM AND SULBACTAM SODIUM 250; 125 MG/ML; MG/ML
3 INJECTION, POWDER, FOR SUSPENSION INTRAMUSCULAR; INTRAVENOUS EVERY 6 HOURS
Refills: 0 | Status: ACTIVE | OUTPATIENT
Start: 2024-06-23 | End: 2025-05-22

## 2024-06-23 RX ORDER — KETOROLAC TROMETHAMINE 30 MG/ML
15 SYRINGE (ML) INJECTION ONCE
Refills: 0 | Status: DISCONTINUED | OUTPATIENT
Start: 2024-06-23 | End: 2024-06-23

## 2024-06-23 RX ORDER — SODIUM CHLORIDE 9 MG/ML
1000 INJECTION INTRAMUSCULAR; INTRAVENOUS; SUBCUTANEOUS
Refills: 0 | Status: ACTIVE | OUTPATIENT
Start: 2024-06-23 | End: 2025-05-22

## 2024-06-23 RX ADMIN — AMPICILLIN SODIUM AND SULBACTAM SODIUM 3 GRAM(S): 250; 125 INJECTION, POWDER, FOR SUSPENSION INTRAMUSCULAR; INTRAVENOUS at 11:45

## 2024-06-23 RX ADMIN — Medication 15 MILLIGRAM(S): at 18:30

## 2024-06-23 RX ADMIN — Medication 15 MILLIGRAM(S): at 17:48

## 2024-06-23 RX ADMIN — Medication 15 MILLIGRAM(S): at 12:08

## 2024-06-23 RX ADMIN — AMPICILLIN SODIUM AND SULBACTAM SODIUM 200 GRAM(S): 250; 125 INJECTION, POWDER, FOR SUSPENSION INTRAMUSCULAR; INTRAVENOUS at 17:47

## 2024-06-23 RX ADMIN — Medication 15 MILLIGRAM(S): at 11:08

## 2024-06-23 RX ADMIN — AMPICILLIN SODIUM AND SULBACTAM SODIUM 200 GRAM(S): 250; 125 INJECTION, POWDER, FOR SUSPENSION INTRAMUSCULAR; INTRAVENOUS at 11:07

## 2024-06-23 RX ADMIN — SODIUM CHLORIDE 120 MILLILITER(S): 9 INJECTION INTRAMUSCULAR; INTRAVENOUS; SUBCUTANEOUS at 17:47

## 2024-06-23 NOTE — ED ADULT TRIAGE NOTE - CHIEF COMPLAINT QUOTE
Pt A&OX4 c/o Left-sided facial swelling x 5 days; no known allergies, no recent dental work, no bug bites or new foods/lotions; uses a daily steroid nasal spray for nasal polyps since March 2024

## 2024-06-23 NOTE — ED PROVIDER NOTE - ATTENDING APP SHARED VISIT CONTRIBUTION OF CARE
Patient well-appearing no acute distress HEENT remarkable for swelling on left side of face tender to palpation  .  Pupils equal round reactive to light cranial nerves II through XII intact   throat no erythema mild tenderness upper second molar   no adenopathy noted.    Heart sounds normal lungs clear abdomen soft nontender extremities no edema Patient well-appearing no acute distress HEENT remarkable for swelling on left side of face tender to palpation  .  Pupils equal round reactive to light cranial nerves II through XII intact   throat no erythema mild tenderness upper second molar   no adenopathy noted.    Heart sounds normal lungs clear abdomen soft nontender extremities no edema.

## 2024-06-23 NOTE — ED CDU PROVIDER INITIAL DAY NOTE - ATTENDING APP SHARED VISIT CONTRIBUTION OF CARE
Patient well-appearing no acute distress HEENT some swelling of left side of face with some tenderness status post I&D of a dental abscess   positive adenopathy on the left neck supple airway patent heart sounds normal lungs clear

## 2024-06-23 NOTE — ED PROVIDER NOTE - CLINICAL SUMMARY MEDICAL DECISION MAKING FREE TEXT BOX
29-year-old male with past medical history depression presents to the ED complaining of left-sided facial swelling for 3 days. With stable hemodynamics, no clinical evidence of airway compromise at this time.  On exam, left-sided intraoral and extraoral swelling noted. Imp: Left-sided facial swelling, suspect odontogenic origin, possibly dental abscess. Plan for labs, CT maxfac, IV abx, analgesics, reassess.

## 2024-06-23 NOTE — ED PROVIDER NOTE - OBJECTIVE STATEMENT
29-year-old male with past medical history depression presents to the ED complaining of left-sided facial swelling for 3 days.  Patient states he woke up a few days ago with swelling to the left side of his face, which progressively got worse.  He denies any trismus, drooling, stridor, neck pain, chest pain, shortness of breath, fevers, chills.  Denies any other complaints.  Denies any recent dental procedure.

## 2024-06-23 NOTE — ED CDU PROVIDER INITIAL DAY NOTE - OBJECTIVE STATEMENT
Initial ED provider note:  "29-year-old male with past medical history depression presents to the ED complaining of left-sided facial swelling for 3 days.  Patient states he woke up a few days ago with swelling to the left side of his face, which progressively got worse.  He denies any trismus, drooling, stridor, neck pain, chest pain, shortness of breath, fevers, chills.  Denies any other complaints.  Denies any recent dental procedure."    CDU note: Agree with above. 29 year old female found to have on CT "Left maxillary first molar carious disease with 1.4 cm periodontal abscess and the left facial cellulitis. Severe left maxillary sinus inflammation likely with odontogenic contribution." Labs reviewed, no leukocytosis. Pt was seen by dental s/p I&D. CDU plan for IV abx and reassessments.

## 2024-06-23 NOTE — ED CDU PROVIDER INITIAL DAY NOTE - CLINICAL SUMMARY MEDICAL DECISION MAKING FREE TEXT BOX
Initial ED provider note:  "29-year-old male with past medical history depression presents to the ED complaining of left-sided facial swelling for 3 days.  Patient states he woke up a few days ago with swelling to the left side of his face, which progressively got worse.  He denies any trismus, drooling, stridor, neck pain, chest pain, shortness of breath, fevers, chills.  Denies any other complaints.  Denies any recent dental procedure."    CDU note. Agree with above. 29 year old female found to have on CT "Left maxillary first molar carious disease with 1.4 cm periodontal abscess and the left facial cellulitis. Severe left maxillary sinus inflammation likely with odontogenic contribution." Labs reviewed, no leukocytosis. Pt was seen by dental s/p I&D. CDU plan for IV abx and reassessments.

## 2024-06-23 NOTE — ED ADULT NURSE NOTE - OBJECTIVE STATEMENT
patient alert ox4 came in c/o swelling and pain to left side of the face and neck. c/o pain, breathing even and unlabored. skin warm and d ry, labs done and meds given as ordered. awaiting CT and results.

## 2024-06-23 NOTE — CONSULT NOTE ADULT - SUBJECTIVE AND OBJECTIVE BOX
Problem: Adult Inpatient Plan of Care  Goal: Plan of Care Review  Outcome: Ongoing, Progressing  Flowsheets (Taken 1/9/2024 0540)  Plan of Care Reviewed With: patient  Goal: Patient-Specific Goal (Individualized)  Outcome: Ongoing, Progressing  Flowsheets (Taken 1/9/2024 0540)  Anxieties, Fears or Concerns: Generalized  Individualized Care Needs: Pain management     Problem: Pain Acute  Goal: Acceptable Pain Control and Functional Ability  Outcome: Ongoing, Progressing  Intervention: Develop Pain Management Plan  Flowsheets (Taken 1/9/2024 0540)  Pain Management Interventions:   breathing exercises utilized   care clustered   cold applied   diversional activity provided   medication offered   pain management plan reviewed with patient/caregiver   position adjusted   quiet environment facilitated   relaxation techniques promoted  Intervention: Prevent or Manage Pain  Flowsheets (Taken 1/9/2024 0540)  Sleep/Rest Enhancement:   awakenings minimized   noise level reduced   regular sleep/rest pattern promoted   relaxation techniques promoted   room darkened  Sensory Stimulation Regulation:   auditory stimulation minimized   lighting decreased   quiet environment promoted   tactile stimulation minimized   visual stimulation minimized  Bowel Elimination Promotion: adequate fluid intake promoted  Medication Review/Management:   medications reviewed   high-risk medications identified  Intervention: Optimize Psychosocial Wellbeing  Flowsheets (Taken 1/9/2024 0540)  Spiritual Activities Assistance: affirmation provided  Supportive Measures:   active listening utilized   positive reinforcement provided   relaxation techniques promoted   verbalization of feelings encouraged     Problem: Bleeding (Knee Arthroplasty)  Goal: Absence of Bleeding  Outcome: Ongoing, Progressing  Intervention: Monitor and Manage Bleeding  Flowsheets (Taken 1/9/2024 0540)  Bleeding Management: dressing monitored     Problem: Neurovascular Compromise  (Knee Arthroplasty)  Goal: Intact Neurovascular Status  Outcome: Ongoing, Progressing  Intervention: Prevent or Manage Neurovascular Compromise  Flowsheets (Taken 1/9/2024 0540)  Compartment Syndrome Management: active flexion/extension encouraged  Compartment Syndrome Surveillance: no pain with passive muscle stretch     Problem: Fall Injury Risk  Goal: Absence of Fall and Fall-Related Injury  Outcome: Ongoing, Progressing  Intervention: Identify and Manage Contributors  Flowsheets (Taken 1/9/2024 0540)  Self-Care Promotion:   BADL personal objects within reach   BADL personal routines maintained   safe use of adaptive equipment encouraged  Medication Review/Management:   medications reviewed   high-risk medications identified      Patient is a 29y old  Male who presents with a chief complaint of facial swelling. Dental consulted to evaluate.    Dental HPI: Pt says that left sided facial swelling began on tuesday 6/18 and progressively expanded as the week went on. Pt took ibuprofen for pain that helped a little bit. Last dental visit was 2022. Pt denies dental pain today but does report history of nasal polyps that have been bothering the patient more this week.      PAST MEDICAL & SURGICAL HISTORY:  Anemia  History of appendectomy  H/O knee surgery    Allergies    No Known Allergies    Intolerances      Last Dental Visit: 2022    Vital Signs Last 24 Hrs  T(C): 36.7 (23 Jun 2024 10:17), Max: 36.7 (23 Jun 2024 10:17)  T(F): 98 (23 Jun 2024 10:17), Max: 98 (23 Jun 2024 10:17)  HR: 84 (23 Jun 2024 10:17) (84 - 84)  BP: 118/60 (23 Jun 2024 10:17) (118/60 - 118/60)  BP(mean): --  RR: 18 (23 Jun 2024 10:17) (18 - 18)  SpO2: 99% (23 Jun 2024 10:17) (99% - 99%)    Parameters below as of 23 Jun 2024 10:17  Patient On (Oxygen Delivery Method): room air        EOE:    Mandible FROM             Facial bones and MOM grossly intact             ( - ) trismus             ( - ) LAD             ( + ) swelling: left sided facial swelling approximating inferior border of left orbit             ( + ) asymmetry: left sided facial swelling             ( + ) palpation- left cheek along zygoma             ( - ) SOB             ( - ) dysphagia             ( - ) LOC    IOE:  permanent dentition: grossly intact           hard/soft palate:  ( - ) palatal torus           tongue/FOM WNL           labial/buccal mucosa WNL           ( - ) percussion           ( + ) palpation: apical to teeth #12-#14           ( + ) swelling: fluctuant vestibular swelling apical to teeth #12-#14           ( - ) mobility    - Tooth #14 chipped DL cusp    LABS:                        11.7   8.57  )-----------( 187      ( 23 Jun 2024 11:05 )             35.4     06-23    134<L>  |  102  |  12  ----------------------------<  99  4.3   |  19<L>  |  0.72    Ca    8.9      23 Jun 2024 11:05    TPro  7.0  /  Alb  3.9  /  TBili  0.2  /  DBili  x   /  AST  21  /  ALT  13  /  AlkPhos  88  06-23    WBC Count: 8.57 K/uL [3.80 - 10.50] (06-23 @ 11:05)  Platelet Count - Automated: 187 K/uL [150 - 400] (06-23 @ 11:05)    Urinalysis Basic - ( 23 Jun 2024 11:05 )    Color: x / Appearance: x / SG: x / pH: x  Gluc: 99 mg/dL / Ketone: x  / Bili: x / Urobili: x   Blood: x / Protein: x / Nitrite: x   Leuk Esterase: x / RBC: x / WBC x   Sq Epi: x / Non Sq Epi: x / Bacteria: x        DENTAL RADIOGRAPHS: PA radiograph taken and interpreted.  - PARL noted on palatal root of tooth #14    ASSESSMENT: Fluctuant vestibular swelling and accompanied left sided facial swelling likely related to PARL on tooth #14. Advised pt that tooth #14 likely needs EXT and for them to f/u with outside dentist or [facility] [phone] for definitive care. I and D can be performed today to drain infection on tooth #14 but without definitive follow up care, infection can return. Pt understood and consented to this tx today. All findings and RBA were discussed and of the pt questions were answered.    PROCEDURE: Limited clinical and radiographic exam completed with pt verbal consent. Incision and drainage indicated. Informed written consent obtained and given to med team upon completion. 18% benzocaine applied to UL vestibule. 2 carpules of 4% septocaine 1:100k epi administered via local infiltration with needles changed after administration of each injection. Profound anesthesia was achieved. 15 blade used to make an incision extending from apical of tooth #13-distal of tooth #14. Hemopurulence was appreciated after dissecting the facial planes and the incision was then irrigated with copious amounts of sterile saline. POIG verbally. Pt instructed to follow up with [facility] to treat tooth #14. Pt tolerated procedure well and was left comfortably with med team. Recommended 1 more round of IV abx to reduce swelling further with pt to then be discharged on PO abx.    RECOMMENDATIONS:  1) 1 more round of IV ABX and discharge on PO abx with OTC pain meds PRN  2) Dental F/U with outpatient dentist or [facility] [phone] for definitive care for tooth #14 and comprehensive dental care.     Gloria Rios, SEPIDEHS [id number] Patient is a 29y old  Male who presents with a chief complaint of facial swelling. Dental consulted to evaluate.    Dental HPI: Pt says that left sided facial swelling began on tuesday 6/18 and progressively expanded as the week went on. Pt took ibuprofen for pain that helped a little bit. Last dental visit was 2022. Pt denies dental pain today but does report history of nasal polyps that have been bothering the patient more this week.      PAST MEDICAL & SURGICAL HISTORY:  Anemia  History of appendectomy  H/O knee surgery    Allergies    No Known Allergies    Intolerances      Last Dental Visit: 2022    Vital Signs Last 24 Hrs  T(C): 36.7 (23 Jun 2024 10:17), Max: 36.7 (23 Jun 2024 10:17)  T(F): 98 (23 Jun 2024 10:17), Max: 98 (23 Jun 2024 10:17)  HR: 84 (23 Jun 2024 10:17) (84 - 84)  BP: 118/60 (23 Jun 2024 10:17) (118/60 - 118/60)  BP(mean): --  RR: 18 (23 Jun 2024 10:17) (18 - 18)  SpO2: 99% (23 Jun 2024 10:17) (99% - 99%)    Parameters below as of 23 Jun 2024 10:17  Patient On (Oxygen Delivery Method): room air        EOE:    Mandible FROM             Facial bones and MOM grossly intact             ( - ) trismus             ( - ) LAD             ( + ) swelling: left sided facial swelling approximating inferior border of left orbit             ( + ) asymmetry: left sided facial swelling             ( + ) palpation- left cheek along zygoma             ( - ) SOB             ( - ) dysphagia             ( - ) LOC    IOE:  permanent dentition: grossly intact           hard/soft palate:  ( - ) palatal torus           tongue/FOM WNL           labial/buccal mucosa WNL           ( - ) percussion           ( + ) palpation: apical to teeth #12-#14           ( + ) swelling: fluctuant vestibular swelling apical to teeth #12-#14           ( - ) mobility    - Tooth #14 chipped DL cusp    LABS:                        11.7   8.57  )-----------( 187      ( 23 Jun 2024 11:05 )             35.4     06-23    134<L>  |  102  |  12  ----------------------------<  99  4.3   |  19<L>  |  0.72    Ca    8.9      23 Jun 2024 11:05    TPro  7.0  /  Alb  3.9  /  TBili  0.2  /  DBili  x   /  AST  21  /  ALT  13  /  AlkPhos  88  06-23    WBC Count: 8.57 K/uL [3.80 - 10.50] (06-23 @ 11:05)  Platelet Count - Automated: 187 K/uL [150 - 400] (06-23 @ 11:05)    Urinalysis Basic - ( 23 Jun 2024 11:05 )    Color: x / Appearance: x / SG: x / pH: x  Gluc: 99 mg/dL / Ketone: x  / Bili: x / Urobili: x   Blood: x / Protein: x / Nitrite: x   Leuk Esterase: x / RBC: x / WBC x   Sq Epi: x / Non Sq Epi: x / Bacteria: x    CT MAXILLOFACIAL:  FINDINGS:  Skin and subcutaneous soft tissues: Left mid to lower facial soft tissue swelling is seen. There is a 1.4 x 1.1 cm rim-enhancing collection adjacent to the left maxillary first molar tooth which demonstrates periapical lucency and dehiscence of the buccal cortex.    Osseous structures: No fracture, dislocation or destructive lesion.    Orbits: Unremarkable.    Paranasal sinuses: Severe mucosal thickening in the left maxillary sinus protruding into the left nasal cavity, likely contributed to by odontogenic disease. Mucous retention cyst/polyp in the right maxillary sinus. Mild-to-moderate mucosal thickening in the ethmoid air cells.    Mastoid air cells: Clear.    Other: The partially visualized intracranial structures are normal.      IMPRESSION:    Left maxillary first molar carious disease with 1.4 cm periodontal abscess and the left facial cellulitis. Severe left maxillary sinus inflammation likely with odontogenic contribution.    DENTAL RADIOGRAPHS: PA radiograph taken and interpreted.  - PARL noted on palatal root of tooth #14    ASSESSMENT: Fluctuant vestibular swelling and accompanied left sided facial swelling likely related to PARL on tooth #14. Advised pt that tooth #14 likely needs EXT and for them to f/u with outside dentist or [facility] [phone] for definitive care. I and D can be performed today to drain infection on tooth #14 but without definitive follow up care, infection can return. Pt understood and consented to this tx today. All findings and RBA were discussed and of the pt questions were answered.    PROCEDURE: Limited clinical and radiographic exam completed with pt verbal consent. Incision and drainage indicated. Informed written consent obtained and given to med team upon completion. 18% benzocaine applied to UL vestibule. 2 carpules of 4% septocaine 1:100k epi administered via local infiltration with needles changed after administration of each injection. Profound anesthesia was achieved. 15 blade used to make an incision extending from apical of tooth #13-distal of tooth #14. Hemopurulence was appreciated after dissecting the facial planes and the incision was then irrigated with copious amounts of sterile saline. POIG verbally. Pt instructed to follow up with [facility] to treat tooth #14. Pt tolerated procedure well and was left comfortably with med team. Recommended 1 more round of IV abx to reduce swelling further with pt to then be discharged on PO abx.    RECOMMENDATIONS:  1) 1 more round of IV ABX and discharge on PO abx with OTC pain meds PRN  2) Dental F/U with outpatient dentist or [facility] [phone] for definitive care for tooth #14 and comprehensive dental care.     Gloria Rios DDS [id number]

## 2024-06-24 VITALS
DIASTOLIC BLOOD PRESSURE: 71 MMHG | TEMPERATURE: 98 F | SYSTOLIC BLOOD PRESSURE: 124 MMHG | OXYGEN SATURATION: 98 % | HEART RATE: 63 BPM | RESPIRATION RATE: 18 BRPM

## 2024-06-24 LAB
HCT VFR BLD CALC: 35.9 % — LOW (ref 39–50)
HGB BLD-MCNC: 11.9 G/DL — LOW (ref 13–17)
MCHC RBC-ENTMCNC: 30.4 PG — SIGNIFICANT CHANGE UP (ref 27–34)
MCHC RBC-ENTMCNC: 33.1 GM/DL — SIGNIFICANT CHANGE UP (ref 32–36)
MCV RBC AUTO: 91.8 FL — SIGNIFICANT CHANGE UP (ref 80–100)
NRBC # BLD: 0 /100 WBCS — SIGNIFICANT CHANGE UP (ref 0–0)
NRBC # FLD: 0 K/UL — SIGNIFICANT CHANGE UP (ref 0–0)
PLATELET # BLD AUTO: 216 K/UL — SIGNIFICANT CHANGE UP (ref 150–400)
RBC # BLD: 3.91 M/UL — LOW (ref 4.2–5.8)
RBC # FLD: 13.5 % — SIGNIFICANT CHANGE UP (ref 10.3–14.5)
WBC # BLD: 7.16 K/UL — SIGNIFICANT CHANGE UP (ref 3.8–10.5)
WBC # FLD AUTO: 7.16 K/UL — SIGNIFICANT CHANGE UP (ref 3.8–10.5)

## 2024-06-24 PROCEDURE — 99239 HOSP IP/OBS DSCHRG MGMT >30: CPT

## 2024-06-24 RX ADMIN — Medication 15 MILLIGRAM(S): at 00:40

## 2024-06-24 RX ADMIN — Medication 15 MILLIGRAM(S): at 06:33

## 2024-06-24 RX ADMIN — SODIUM CHLORIDE 120 MILLILITER(S): 9 INJECTION INTRAMUSCULAR; INTRAVENOUS; SUBCUTANEOUS at 06:43

## 2024-06-24 RX ADMIN — AMPICILLIN SODIUM AND SULBACTAM SODIUM 200 GRAM(S): 250; 125 INJECTION, POWDER, FOR SUSPENSION INTRAMUSCULAR; INTRAVENOUS at 00:40

## 2024-06-24 RX ADMIN — AMPICILLIN SODIUM AND SULBACTAM SODIUM 200 GRAM(S): 250; 125 INJECTION, POWDER, FOR SUSPENSION INTRAMUSCULAR; INTRAVENOUS at 06:33

## 2024-06-24 NOTE — ED CDU PROVIDER SUBSEQUENT DAY NOTE - ATTENDING APP SHARED VISIT CONTRIBUTION OF CARE
Agree with PA note  29-year-old male with past medical history of depression presented to the emergency room for 3 days of left facial swelling.  Was found to have a left periodontal abscess and left facial cellulitis.  Dental in the interim has performed an I&D.  Patient placed in CDU for IV antibiotics and reassessments.  Patient is feeling much better this morning.  Denies any fevers.  Able to tolerate p.o.  Patient will be discharged this morning.  Physical exam  Well-appearing male in no respiratory distress  Vital signs stable  Left facial swelling, improved  Clear to auscultation bilaterally  Impression  Left dental abscess status post I&D, will reach out to dental for recommendations but likely patient can be discharged home with p.o. antibiotics

## 2024-06-24 NOTE — ED CDU PROVIDER DISPOSITION NOTE - ATTENDING CONTRIBUTION TO CARE
agree with PA note  my subsequent day note  "Agree with PA note  29-year-old male with past medical history of depression presented to the emergency room for 3 days of left facial swelling.  Was found to have a left periodontal abscess and left facial cellulitis.  Dental in the interim has performed an I&D.  Patient placed in CDU for IV antibiotics and reassessments.  Patient is feeling much better this morning.  Denies any fevers.  Able to tolerate p.o.  Patient will be discharged this morning.  Physical exam  Well-appearing male in no respiratory distress  Vital signs stable  Left facial swelling, improved  Clear to auscultation bilaterally  Impression  Left dental abscess status post I&D, will reach out to dental for recommendations but likely patient can be discharged home with p.o. antibiotics.  "    pt stable for d/c

## 2024-06-24 NOTE — ED CDU PROVIDER SUBSEQUENT DAY NOTE - PROGRESS NOTE DETAILS
HOPE Corrigan: 29-year-old male with no significant past medical history presented to the emergency department complaining of left-sided facial swelling x 3 days.  Patient found to have dental abscess I&D by dental team, patient placed in CDU for IV antibiotics and reassessment.  Patient feels improved, tolerating p.o., discharge and results reviewed with patient.

## 2024-06-24 NOTE — ED CDU PROVIDER DISPOSITION NOTE - PATIENT PORTAL LINK FT
You can access the FollowMyHealth Patient Portal offered by [facility] by registering at the following website: [email or web address] By joining [email or web address] FollowMyHealth portal, you will also be able to view your health information using other applications (apps) compatible with our system.

## 2024-06-24 NOTE — ED CDU PROVIDER SUBSEQUENT DAY NOTE - CLINICAL SUMMARY MEDICAL DECISION MAKING FREE TEXT BOX
29-year-old male with past medical history depression presents to the ED complaining of left-sided facial swelling for 3 days.  Patient states he woke up a few days ago with swelling to the left side of his face, which progressively got worse. 29 year old female found to have on CT shows "Left maxillary first molar carious disease with 1.4 cm periodontal abscess and the left facial cellulitis. Severe left maxillary sinus inflammation likely with odontogenic contribution." Labs reviewed, no leukocytosis. Pt was seen by dental s/p I&D. CDU plan for IV abx and reassessments. Patient still pending continued monitoring and treatment with dental re-evaluation in the am.

## 2024-06-24 NOTE — ED CDU PROVIDER SUBSEQUENT DAY NOTE - HISTORY
29-year-old male with past medical history depression presents to the ED complaining of left-sided facial swelling for 3 days.  Patient states he woke up a few days ago with swelling to the left side of his face, which progressively got worse.  He denies any trismus, drooling, stridor, neck pain, chest pain, shortness of breath, fevers, chills.  Denies any other complaints.  Denies any recent dental procedure. CT shows "Left maxillary first molar carious disease with 1.4 cm periodontal abscess and the left facial cellulitis. Severe left maxillary sinus inflammation likely with odontogenic contribution." Labs reviewed, no leukocytosis. Pt was seen by dental s/p I&D. CDU plan for IV abx and reassessments.    Interval history: Patient continues to rest comfortably in CDU. Vitals have been stable throughout CDU stay. Patient has no new complaints or concerns upon re-evaluation. Patient still pending continued monitoring and treatment with dental re-evaluation in the am.

## 2024-06-24 NOTE — ED CDU PROVIDER DISPOSITION NOTE - NSFOLLOWUPINSTRUCTIONS_ED_ALL_ED_FT
Follow up with your Primary Medical Doctor in 1-2 days.  Follow up with your Dentist or call the clinic to schedule an appointment [phone].    Soft diet eat on opposite side of mouth.    Take Augmentin one tablet orally 2 x a day x 7 days.   Take Ibuprofen 400mg every 6 hours as needed for pain take with food.    Return to the ER for any persistent/worsening or new symptoms swelling, fevers, chills or any concerning symptoms.

## 2024-07-01 ENCOUNTER — APPOINTMENT (OUTPATIENT)
Age: 29
End: 2024-07-01

## 2024-07-19 ENCOUNTER — APPOINTMENT (OUTPATIENT)
Age: 29
End: 2024-07-19

## 2024-08-15 NOTE — ED PROVIDER NOTE - CARE PLAN
"[facility]   Physical Therapy Treatment Note / Discharge Summary      Name: Tika Navarro  Clinic Number: [id number]    Therapy Diagnosis:   Encounter Diagnosis   Name Primary?    Impaired functional mobility, balance, gait, and endurance Yes           Physician: Ariella Rao MD    Visit Date: 8/15/2024    Physician Orders: Eval and Treat   Medical Diagnosis from Referral: G20.B2 (ICD-10-CM) - Parkinson's disease with dyskinesia and fluctuating manifestations   Evaluation Date: 2024  Authorization Period Expiration: 2024   Plan of Care Expiration: 24   Updated Plan of Care Expiration: 24   Progress Note Due: 24   Visit # / Visits authorized:   FOTO: 3/3     Precautions: Standard and Fall     Time In: 9:31 am   Time Out: 10:16 am  Total Time: 45 minutes     PTA Visit #: 0/5       Subjective     Pt reports: "I'm so tired today."     She was compliant with home exercise program.     Response to previous treatment: Denies soreness  Functional change: Ongoing    Pain: 0/10   Location: NA    Objective      Right Lower Extremity Strength Grade   5/20/24 7/10/24 8/15/2024   Left Lower Extremity Strength Grade  5/20/24 7/10/24 8/15/2024     Hip Flexion 4+/5 4/5 4+/5 Hip Flexion 4+/5 4/5 4+/5   Hip Extension 3+/5 3+/5 4/5 Hip Extension 3+/5 3+/5 4/5   Knee Flexion 4+/5 4+/5 5/5 Knee Flexion 4+/5 4+/5 5/5   Knee Extension 4+/5 5/5 5/5 Knee Extension 4+/5 5/5 5/5   Ankle Dorsiflexion 4+/5 4+/5  4+/5 Ankle Dorsiflexion 4+/5 4+/5 4+/5   Hip abduction 4+/5 4+/5 4+/5 Hip abduction 4+/5 4+/5 4+/5      Range of Motion:    Right hip flexion PROM: 101 deg flex       APTA Core Set Outcome Measures for Adults with Neurologic Conditions        Evaluation 5/20/24 7/10/24 2024    8/15/2024   Reference values      Timed Up and Go  TU sec   TUG Cognitive: 17 sec   TUG Manual: 13 sec    TU sec  NT NT score >14 seconds indicates risk for falls in older stroke " "patients (Juliana et al, 2006)     10 Meter Walk Test  1.0 m/sec (6m/6s)   "Community ambulator" speed category    1.0 m/sec (6m/6s)   "Community ambulator" speed category 1.0 m/sec (6m/6s)   "Community ambulator" speed category 1.0 m/sec (6m/6s)   "Community ambulator" speed category 0-0.4 m/s = household walker  0.4-0.8 m/s = limited community ambulator   0.8-1.4 m/s = community ambultor  >1.4 m/s = can cross street safely      Functional Gait Assessment  21/30 21/30 NT 23/30 <22/30 = identifies fall risk in community dwelling older adults   (MCID = 4)     Villaseñor Balance Test Not Assessed    Not Assessed.  Not Assessed.  NT Fall risk cut-off for stroke= 45/56     6 Minute Walk Test   Not Assessed - Assess Next Visit Not Assessed - Assess in future visit.  Not Assessed.  1200 feet 6 Minute Walk Test Distances: Means by Age and Gender     Age Gender Mean   60-69 Female  Male 572 meters (1876 feet)  538 meters (1765 feet)   70-79 Female  Male 527 meters (1729 feet)  471 meters (1545 feet)   80-89 Female  Male 417 meters (1368 feet)  392 meters ( 1286 feet)    JENNI Denney (2000)       5 times sit-stand    35 seconds     2 without hands, 3 with hands 25 seconds      2 with hands, 3 without hands  17 seconds      No hands   13 sec  No hands  >12 sec= fall risk for general elderly  >10 sec= balance/vestibular dysfunction (<61 y/o)  >14.2 sec= balance/vestibular dysfunction (>61 y/o)  >12 sec= fall risk for stroke          Evaluation 5/20/24 7/10/24 8/5/2024    8/15/2024     Tandem Stance R LE forward, eyes open 12 sec  (<30 sec = Increased FALL RISK) 20 sec  30 sec NT   Tandem Stance L LE forward, eyes open 30 sec  (<30 sec = Increased FALL RISK) 30 + sec  NT NT   Single Limb Stance R LE  eyes open 12 sec  (<10 sec = HIGH FALL RISK) 23 sec NT NT   Single Limb Stance L LE  eyes open 6 sec  (<10 sec = HIGH FALL RISK) 8 sec 8 sec  10 sec       Functional Gait Assessment:   1. Gait on level surface =  2 (6 sec)    (3) " Normal: less than 5.5 sec, no A.D., no imbalance, normal gait pattern, deviates< 6in   (2) Mild impairment: 7-5.6 sec, uses A.D., mild gait deviations, or deviates 6-10 in   (1) Moderate impairment: > 7 sec, slow speed, imbalance, deviates 10-15 in.   (0) Severe impairment: needs assist, deviates >15 in, reach/touch wall  2. Change in Gait Speed = 2   (3) Normal: smooth change w/o loss of balance or gait deviation, deviates < 6 in, significant difference between speeds   (2) Mild impairment: changes speed, but demonstrates mild gait deviations, deviates 6-10 in, OR no deviations but unable to significantly speed, OR uses A.D.   (1) Moderate impairment: minor changes to speed, OR changes speed w/ significant deviations, deviates 10-15 in, OR  Changes speed , but loses balance & recovers   (0) Severe impairment: cannot change speed, deviates >15 in, or loses balance & needs assist  3. Gait with horizontal head turns  = 2   (3) Normal: no change in gait, deviates <6 in   (2) Mild impairment: slight change in speed, deviates 6-10 in, OR uses A.D.   (1) Moderate impairment: moderate change in speed, deviates 10-15 in   (0) Severe impairment: severe disruption of gait, deviates >15in  4. Gait with vertical head turns = 3   (3) Normal: no change in gait, deviates <6 in   (2) Mild impairment: slight change in speed, deviates 6-10 in OR uses A.D.   (1) Moderate impairment: moderate change in speed, deviates 10-15 in   (0) Severe impairment: severe disruption of gait, deviates >15 in  5. Gait with pivot turns = 3   (3) Normal: performs safely in 3 sec, no LOB   (2) Mild impairment: performs in >3 sec & no LOB, OR turns safely & requires several steps to regain LOB   (1) Moderate impairment: turns slow, OR requires several small steps for balance following turn & stop   (0) Severe impairment: cannot turn safely, needs assist  6. Step over obstacle = 3   (3) Normal: steps over 2 stacked boxes w/o change in speed or LOB   (2)  Mild impairment: able to step over 1 box w/o change in speed or LOB   (1) Moderate impairment: steps over 1 box but must slow down, may require VC   (0) Severe impairment: cannot perform w/o assist  7. Gait with Narrow ROSELIA = 2   (3) Normal: 10 steps no staggering   (2) Mild impairment: 7-9 steps   (1) Moderate impairment: 4-7 steps   (0) Severe impairment: < 4 steps or cannot perform w/o assist  8. Gait with eyes closed = 1 (10 sec)    (3) Normal: < 7 sec, no A.D., no LOB, normal gait pattern, deviates <6 in   (2) Mild impairment: 7.1-9 sec, mild gait deviations, deviates 6-10 in   (1) Moderate impairment: > 9 sec, abnormal pattern, LOB, deviates 10-15 in   (0) Severe impairment: cannot perform w/o assist, LOB, deviates >15in  9. Ambulating Backwards = 2   (3) Normal: no A.D., no LOB, normal gait pattern, deviates <6in   (2) Mild impairment: uses A.D., slower speed, mild gait deviations, deviates 6-10 in   (1) Moderate impairment: slow speed, abnormal gait pattern, LOB, deviates 10-15 in   (0) Severe impairment: severe gait deviations or LOB, deviates >15in  10. Steps = 3   (3) Normal: alternating feet, no rail   (2) Mild Impairment: alternating feet, uses rail   (1) Moderate impairment: step-to, uses rail   (0) Severe impairment: cannot perform safely    Score 23/30     Score:   <22/30 fall risk   <20/30 fall risk in older adults   <18/30 fall risk in Parkinsons         FOTO Degenerative CNS Disorders Survey:       Therapist reviewed FOTO scores for Tika Navarro on 8/15/2024.   FOTO documents entered into [employer] - see Media section.    Functional Status Score: 51            Treatment     Kusum received the treatments listed below    LSVT Protocol    Week:  (Session:) 4/4  (16/16)       Tika DECKER received individual therapy including neuro re-education for 10 minutes including:    LSVT Maximal Daily Exercises to promote amplitude: (10 repetitions each)   Daily Exercises   Performance  Comments/Modifications    1  Floor <> Ceiling  good BIG Arms/Hands   Sitting on red swiss ball   2  Side <> Side  good BIG Arms/Hands   Sitting on red swiss ball  Alternating sides    3  Step & Reach  (forward)   good occasional upper extremity support    CGA  Stepping over sary onto foam pad, alternating sides    4  Step & Reach (sideways)   good - Not Today    5  Step & Reach  (backwards)   fair - Not Today    6  Rock & Reach  (foward)   good - Not Today    7  Rock & Reach   (sideways twist)   good - Not Today       Patient participated in dynamic functional therapeutic activities to improve functional performance for 35 minutes. Including:     Reassessment of objective measures. See Objective Section above for details.     Functional Component Tasks: - Not Today   Sit to stands: 10 reps with feet on blue Airex pad   Tall kneel to half kneel x 4 each side, occ touch down support (single upper extremity support when bringing right leg forward)   Half kneel hip flexor stretch x 60 sec each side  Long sitting hamstring stretch x 60 sec each side   High knee marching with opposite upper extremity reach: 10x each side     Hierarchy Task - Practicing floor transfer x 2    On blue mat - into downward dog pose        Patient participated in gait training activities to normalize gait pattern for 0 minutes. The following activities were included: - Not Today   BIG Walking naming plants in alphabetical order.  VC for BIG steps and BIG arms. - Not Today   2 x 100 ft of forward ambulation with head turns right to left - Not Today   2 x 100 ft of forward ambulation with head nods up/down - Not Today   Gait belt used for safety.   Assistive device: none       Pt stated working at 10/10 on RPE scale entire session and understands need to give 10/10 effort .     Carryover assignment tracking:   Date Activity   1 7/22/2024 LSVT maximal daily exercises    2 7/22/2024 LSVT daily exercises before session tomorrow afternoon   3 7/24/2024 BIG trunk lean  forward to stand up from toilet    4 8/15/2024 BIG turns when putting dishes away    5     6        Patient Education and Home Exercises       Education provided:   - Demonstration and instruction on all activities included in today's session  - LSVT Protocol  - Handout for LSVT BIG for Life and NOBA Dance For Life  - Information on exercise recommendations for individuals with Parkinson's Disease    Written Home Exercises Provided: Provided in previous visit. Exercises were reviewed and Kusum was able to demonstrate them prior to the end of the session.  Kusum demonstrated good  understanding of the education provided. See EMR under Patient Instructions for exercises provided during therapy sessions    Assessment     Kusum tolerated final session well with focus on reassessment of progress. Kusum demonstrated improvements in hip flexion/extension strength, right hip flexion range of motion, Five Time Sit to Stand Time, left single leg stance time, FGA score and 6 Minute Walk Test Distance. She is no longer categorized as a fall risk based on her FGA score. She has met 2/3 short-term goals and 7/11 long-term goals. She made steady progress toward remaining goals. She has completed the LSVT BIG protocol at this time and is ready to begin LSVT BIG For Life. She is discharged from Outpatient Physical Therapy.     Goals:        Short Term Goals: (2 weeks) Date established: Status:    1. Patient will be provided with education and instruction re: LSVT exercises to be performed as part of home exercise program.  7/10/2024    MET   2.  Patient will demonstrate improve endurance and activity tolerance as evidenced by ability to perform Nu-step x 15 minutes without rests breaks with heart rate post-activity equivalent to 50-80% of maximum heart rate.  7/10/2024    NOT MET    3. Patient will participate in 6 Minute Walk Test to further assess endurance and efficiency with community level gait and goals will be established  accordingly.  7/10/2024    MET         Long Term Goals: (4 weeks) Date established: Status:    1. Patient will be independent and compliant with LSVT exercises to be performed 2x/day per LSVT BIG protocol.  7/10/2024    MET   2.  Patient will increase her gait speed as assessed by the timed 10 Meter Walk Test from 1.0 m/s to 1.18 m/s to increase her safety and (I) with gait at a community level. (Minimal detectable change for Parkinson's Disease= 0.18 m/s)     7/10/2024    NOT MET      3. Patient will increase bilateral hip flexor strength as assessed by manual muscle test by 1/2 grade as appropriate to improve functional mobility.  7/10/2024    MET   4. Patient will increase bilateral hip extensor strength as assessed by manual muscle test by 1/2 grade as appropriate to improve functional mobility.  7/10/2024    MET   5. Patient will begin some form of community fitness to begin regular and consistent performance of exercise to continue maintenance of gains made in physical therapy. 7/10/2024    MET   6. Patient will improve her score on the 5 Times Sit to Stand test from 25 to seconds to </= 20 seconds without use of hands to demonstrate improved independence and efficiency with functional mobility. (Fall risk cut-off= >16)  7/10/2024    MET   7. Patient will increase right hip flexion range of motion from 96 deg to 110 deg to improve her ability to tie her shoes.  7/10/2024    NOT MET / Progressing   8. Patient will maintain right tandem stance for 30 seconds to demonstrate a reduction in fall risk.  7/10/2024    MET   9. Patient will maintain left single leg stance for at least 10 seconds to demonstrate a reduction in fall risk.  7/10/2024    MET   10. Patient will improve his/her score on the Functional Gait Assessment (FGA) from 21/30 to 25/30, indicating improved safety and (I) with dynamic,comunity level gait. (Minimal Detectable Change for PD= 4 points)  7/10/2024    NOT MET/ Progressing    11. Patient will  improve 6 Minute Walk Test distance to at least 1300 feet to demonstrate improvements in endurance and efficiency with community- level gait.  8/15/2024   NOT MET/ Progressing       Plan     Patient is discharged from Outpatient Physical Therapy.     Francine Jeffrey, PT   07/24/2024                                     Principal Discharge DX:	Maxillary sinus fracture
